# Patient Record
Sex: FEMALE | Race: BLACK OR AFRICAN AMERICAN | NOT HISPANIC OR LATINO
[De-identification: names, ages, dates, MRNs, and addresses within clinical notes are randomized per-mention and may not be internally consistent; named-entity substitution may affect disease eponyms.]

---

## 2017-07-10 ENCOUNTER — RESULT REVIEW (OUTPATIENT)
Age: 52
End: 2017-07-10

## 2018-02-22 ENCOUNTER — EMERGENCY (EMERGENCY)
Facility: HOSPITAL | Age: 53
LOS: 1 days | End: 2018-02-22
Attending: PERSONAL EMERGENCY RESPONSE ATTENDANT | Admitting: PERSONAL EMERGENCY RESPONSE ATTENDANT
Payer: COMMERCIAL

## 2018-02-22 VITALS
TEMPERATURE: 100 F | SYSTOLIC BLOOD PRESSURE: 148 MMHG | OXYGEN SATURATION: 99 % | DIASTOLIC BLOOD PRESSURE: 88 MMHG | RESPIRATION RATE: 18 BRPM | HEART RATE: 87 BPM

## 2018-02-22 DIAGNOSIS — Z98.1 ARTHRODESIS STATUS: Chronic | ICD-10-CM

## 2018-02-22 LAB
ALBUMIN SERPL ELPH-MCNC: 4.6 G/DL — SIGNIFICANT CHANGE UP (ref 3.3–5)
ALP SERPL-CCNC: 48 U/L — SIGNIFICANT CHANGE UP (ref 40–120)
ALT FLD-CCNC: 13 U/L RC — SIGNIFICANT CHANGE UP (ref 10–45)
ANION GAP SERPL CALC-SCNC: 12 MMOL/L — SIGNIFICANT CHANGE UP (ref 5–17)
APTT BLD: 27.9 SEC — SIGNIFICANT CHANGE UP (ref 27.5–37.4)
AST SERPL-CCNC: 18 U/L — SIGNIFICANT CHANGE UP (ref 10–40)
BASOPHILS # BLD AUTO: 0 K/UL — SIGNIFICANT CHANGE UP (ref 0–0.2)
BASOPHILS NFR BLD AUTO: 0.7 % — SIGNIFICANT CHANGE UP (ref 0–2)
BILIRUB SERPL-MCNC: 0.3 MG/DL — SIGNIFICANT CHANGE UP (ref 0.2–1.2)
BUN SERPL-MCNC: 20 MG/DL — SIGNIFICANT CHANGE UP (ref 7–23)
CALCIUM SERPL-MCNC: 10.4 MG/DL — SIGNIFICANT CHANGE UP (ref 8.4–10.5)
CHLORIDE SERPL-SCNC: 101 MMOL/L — SIGNIFICANT CHANGE UP (ref 96–108)
CK MB BLD-MCNC: 1.8 % — SIGNIFICANT CHANGE UP (ref 0–3.5)
CK MB CFR SERPL CALC: 2 NG/ML — SIGNIFICANT CHANGE UP (ref 0–3.8)
CK SERPL-CCNC: 111 U/L — SIGNIFICANT CHANGE UP (ref 25–170)
CO2 SERPL-SCNC: 23 MMOL/L — SIGNIFICANT CHANGE UP (ref 22–31)
CREAT SERPL-MCNC: 0.97 MG/DL — SIGNIFICANT CHANGE UP (ref 0.5–1.3)
EOSINOPHIL # BLD AUTO: 0.2 K/UL — SIGNIFICANT CHANGE UP (ref 0–0.5)
EOSINOPHIL NFR BLD AUTO: 2.5 % — SIGNIFICANT CHANGE UP (ref 0–6)
GLUCOSE SERPL-MCNC: 123 MG/DL — HIGH (ref 70–99)
HCT VFR BLD CALC: 41.4 % — SIGNIFICANT CHANGE UP (ref 34.5–45)
HGB BLD-MCNC: 13.9 G/DL — SIGNIFICANT CHANGE UP (ref 11.5–15.5)
INR BLD: 1.03 RATIO — SIGNIFICANT CHANGE UP (ref 0.88–1.16)
LYMPHOCYTES # BLD AUTO: 2.2 K/UL — SIGNIFICANT CHANGE UP (ref 1–3.3)
LYMPHOCYTES # BLD AUTO: 32.1 % — SIGNIFICANT CHANGE UP (ref 13–44)
MCHC RBC-ENTMCNC: 29.5 PG — SIGNIFICANT CHANGE UP (ref 27–34)
MCHC RBC-ENTMCNC: 33.6 GM/DL — SIGNIFICANT CHANGE UP (ref 32–36)
MCV RBC AUTO: 87.6 FL — SIGNIFICANT CHANGE UP (ref 80–100)
MONOCYTES # BLD AUTO: 0.4 K/UL — SIGNIFICANT CHANGE UP (ref 0–0.9)
MONOCYTES NFR BLD AUTO: 6.4 % — SIGNIFICANT CHANGE UP (ref 2–14)
NEUTROPHILS # BLD AUTO: 3.9 K/UL — SIGNIFICANT CHANGE UP (ref 1.8–7.4)
NEUTROPHILS NFR BLD AUTO: 58.3 % — SIGNIFICANT CHANGE UP (ref 43–77)
PLATELET # BLD AUTO: 344 K/UL — SIGNIFICANT CHANGE UP (ref 150–400)
POTASSIUM SERPL-MCNC: 4.6 MMOL/L — SIGNIFICANT CHANGE UP (ref 3.5–5.3)
POTASSIUM SERPL-SCNC: 4.6 MMOL/L — SIGNIFICANT CHANGE UP (ref 3.5–5.3)
PROT SERPL-MCNC: 8 G/DL — SIGNIFICANT CHANGE UP (ref 6–8.3)
PROTHROM AB SERPL-ACNC: 11.2 SEC — SIGNIFICANT CHANGE UP (ref 9.8–12.7)
RBC # BLD: 4.73 M/UL — SIGNIFICANT CHANGE UP (ref 3.8–5.2)
RBC # FLD: 14.1 % — SIGNIFICANT CHANGE UP (ref 10.3–14.5)
SODIUM SERPL-SCNC: 136 MMOL/L — SIGNIFICANT CHANGE UP (ref 135–145)
TROPONIN T SERPL-MCNC: <0.01 NG/ML — SIGNIFICANT CHANGE UP (ref 0–0.06)
WBC # BLD: 6.8 K/UL — SIGNIFICANT CHANGE UP (ref 3.8–10.5)
WBC # FLD AUTO: 6.8 K/UL — SIGNIFICANT CHANGE UP (ref 3.8–10.5)

## 2018-02-22 PROCEDURE — 71046 X-RAY EXAM CHEST 2 VIEWS: CPT | Mod: 26

## 2018-02-22 PROCEDURE — 93010 ELECTROCARDIOGRAM REPORT: CPT

## 2018-02-22 PROCEDURE — 99220: CPT | Mod: 25

## 2018-02-22 RX ORDER — ASPIRIN/CALCIUM CARB/MAGNESIUM 324 MG
325 TABLET ORAL ONCE
Qty: 0 | Refills: 0 | Status: COMPLETED | OUTPATIENT
Start: 2018-02-22 | End: 2018-02-22

## 2018-02-22 RX ORDER — FENOFIBRATE,MICRONIZED 130 MG
145 CAPSULE ORAL AT BEDTIME
Qty: 0 | Refills: 0 | Status: DISCONTINUED | OUTPATIENT
Start: 2018-02-22 | End: 2018-02-26

## 2018-02-22 RX ORDER — SIMVASTATIN 20 MG/1
20 TABLET, FILM COATED ORAL AT BEDTIME
Qty: 0 | Refills: 0 | Status: DISCONTINUED | OUTPATIENT
Start: 2018-02-22 | End: 2018-02-26

## 2018-02-22 RX ORDER — FAMOTIDINE 10 MG/ML
20 INJECTION INTRAVENOUS ONCE
Qty: 0 | Refills: 0 | Status: COMPLETED | OUTPATIENT
Start: 2018-02-22 | End: 2018-02-23

## 2018-02-22 RX ORDER — SODIUM CHLORIDE 9 MG/ML
3 INJECTION INTRAMUSCULAR; INTRAVENOUS; SUBCUTANEOUS EVERY 12 HOURS
Qty: 0 | Refills: 0 | Status: DISCONTINUED | OUTPATIENT
Start: 2018-02-22 | End: 2018-02-26

## 2018-02-22 RX ORDER — LIDOCAINE 4 G/100G
10 CREAM TOPICAL ONCE
Qty: 0 | Refills: 0 | Status: DISCONTINUED | OUTPATIENT
Start: 2018-02-22 | End: 2018-02-26

## 2018-02-22 RX ORDER — METFORMIN HYDROCHLORIDE 850 MG/1
850 TABLET ORAL DAILY
Qty: 0 | Refills: 0 | Status: DISCONTINUED | OUTPATIENT
Start: 2018-02-22 | End: 2018-02-26

## 2018-02-22 NOTE — ED ADULT NURSE NOTE - CHPI ED SYMPTOMS NEG
no wheezing/no fever/no hemoptysis/no chest pain/no cough/no headache/no diaphoresis/no chills/no body aches

## 2018-02-22 NOTE — ED CDU PROVIDER INITIAL DAY NOTE - OBJECTIVE STATEMENT
51 y/o F with pmhx DM, GERD, HLD, and daily cigar smoking, presenting with constant SOB since Sunday. pt reports SOB seems to be worsened after eating. thought it was her acid reflux but went to [facility] MD who sent her in for concern for her heart.  pt reports that SOB is not worsened by exertion. Does report mild congestion and very mild dry cough consistent with her seasonal allergies. Took pepto-bismal without relief.   She denies chest pain, fever, vomiting, exertional dyspnea, orthopnea, LE edema, PND, recent travel

## 2018-02-22 NOTE — ED ADULT NURSE NOTE - PMH
Anemia  mild, takes iron PRN  Diabetes Mellitus  since 2008 and does not check sugar levels  Disc Disease with Myelopathy, Cervical  8/11  High Triglycerides    Hypercholesterolemia    LAMBERTO (Obstructive Sleep Apnea)  signs and symptoms and never tested  Seasonal allergies    Vertigo

## 2018-02-22 NOTE — ED PROVIDER NOTE - MEDICAL DECISION MAKING DETAILS
52 y f with DM, hld, daily smoker presenting with SOB since sunday not made worse with exercise. DDx: ACS, COPD, GERD. Will get cardiac enzymes ekg, CXR, if delta trop negative will likely DC with cardiology follow up of CDU for stress.

## 2018-02-22 NOTE — ED CDU PROVIDER INITIAL DAY NOTE - MEDICAL DECISION MAKING DETAILS
Attending MD Manzo.  Agree with above.  PT is a 52 yr old female with GERD, HLD, DMII, daily cigar smoker with SOB since sunday.  Mild assoc nausea.  PT has had no CP/abdominal pain/vomiting.  Pt seen at [facility] md today.  PT told she should come to ED for ACS rule-out.  Onset of SOB was non-descript.  Pt cannot recall and inciting event.  Equal exercise tolerance since sxs began.  PE pt is well appearing no LE DVT signs.  BReath sounds clear.  No CP on palpation.  No fevers/chills/orthopnea.  NO assoc palpitations.  Likely pulmonary in nature.  No previous cardiac work-up or cards hx.  Pt has risk factors of DMII, HLD, age, daily cigar smoking.  Pt warrants troponin cycling and stress test given cardiac risk factors and SOB.  Will likely require pulm referral as outpt if cardiac work-up is negative.  No LE edema, no chest pain, no hypoxia or tachycardia.  Pt amenable to CDU stay.  Stable for transfer of care to observation/CDU.

## 2018-02-22 NOTE — ED PROVIDER NOTE - OBJECTIVE STATEMENT
Pt is 52 Y F with pmhx DM, GERD, HLD, and daily cigar smoking, presenting with constant SOB since Sunday. She was seen in [facility] MD today and told to come to ED for cardiac workup. She admits to nausea. She denies chest pain, fever, vomiting, exertional dyspnea, orthopnea, LE edema, cough, PND.

## 2018-02-22 NOTE — ED ADULT NURSE NOTE - OBJECTIVE STATEMENT
pt states, "I have been feeling SOB for the past x5 days. Today I decided to go to my PMD and I had an EKG done. I was told there were changes and was told to come to the ED." pt denies any lightheadedness, dizziness, GALLAGHER, cough, fever/chills, v/d, abd. pain, numbness/tingling, or any worsening leg swelling. pt endorses feeling increasingly more SOB when lying down.

## 2018-02-22 NOTE — ED ADULT NURSE NOTE - NS ED NURSE DC INFO COMPLEXITY
Simple: Patient demonstrates quick and easy understanding/Verbalized Understanding/Straightforward: Basic instructions, no meds, no home treatment/Patient asked questions/Returned Demonstration

## 2018-02-22 NOTE — ED CDU PROVIDER INITIAL DAY NOTE - ATTENDING CONTRIBUTION TO CARE
Attending MD Manzo.  Agree with above.  PT is a 52 yr old female with GERD, HLD, DMII, daily cigar smoker with SOB since sunday.  Mild assoc nausea.  PT has had no CP/abdominal pain/vomiting.  Pt seen at [facility] md today.  PT told she should come to ED for ACS rule-out.  Onset of SOB was non-descript.  Pt cannot recall and inciting event.  Equal exercise tolerance since sxs began.  PE pt is well appearing no LE DVT signs.  BReath sounds clear.  No CP on palpation.  No fevers/chills/orthopnea.  NO assoc palpitations.  Likely pulmonary in nature.  No previous cardiac work-up or cards hx.  Pt has risk factors of DMII, HLD, age, daily cigar smoking.  Pt warrants troponin cycling and stress test given cardiac risk factors and SOB.  Will likely require pulm referral as outpt if cardiac work-up is negative.  No LE edema, no chest pain, no hypoxia or tachycardia.  Pt amenable to CDU stay.  Stable for transfer of care to observation/CDU.

## 2018-02-23 VITALS
OXYGEN SATURATION: 98 % | DIASTOLIC BLOOD PRESSURE: 71 MMHG | RESPIRATION RATE: 18 BRPM | SYSTOLIC BLOOD PRESSURE: 120 MMHG | TEMPERATURE: 98 F | HEART RATE: 87 BPM

## 2018-02-23 LAB
D DIMER BLD IA.RAPID-MCNC: <150 NG/ML DDU — SIGNIFICANT CHANGE UP
GLUCOSE BLDC GLUCOMTR-MCNC: 152 MG/DL — HIGH (ref 70–99)
TROPONIN T SERPL-MCNC: <0.01 NG/ML — SIGNIFICANT CHANGE UP (ref 0–0.06)

## 2018-02-23 PROCEDURE — 80061 LIPID PANEL: CPT

## 2018-02-23 PROCEDURE — 99284 EMERGENCY DEPT VISIT MOD MDM: CPT | Mod: 25

## 2018-02-23 PROCEDURE — 93016 CV STRESS TEST SUPVJ ONLY: CPT

## 2018-02-23 PROCEDURE — 82550 ASSAY OF CK (CPK): CPT

## 2018-02-23 PROCEDURE — 93017 CV STRESS TEST TRACING ONLY: CPT

## 2018-02-23 PROCEDURE — 96374 THER/PROPH/DIAG INJ IV PUSH: CPT | Mod: XU

## 2018-02-23 PROCEDURE — 71046 X-RAY EXAM CHEST 2 VIEWS: CPT

## 2018-02-23 PROCEDURE — 78452 HT MUSCLE IMAGE SPECT MULT: CPT

## 2018-02-23 PROCEDURE — 85610 PROTHROMBIN TIME: CPT

## 2018-02-23 PROCEDURE — 93018 CV STRESS TEST I&R ONLY: CPT

## 2018-02-23 PROCEDURE — 82962 GLUCOSE BLOOD TEST: CPT

## 2018-02-23 PROCEDURE — 84484 ASSAY OF TROPONIN QUANT: CPT

## 2018-02-23 PROCEDURE — 85730 THROMBOPLASTIN TIME PARTIAL: CPT

## 2018-02-23 PROCEDURE — 83036 HEMOGLOBIN GLYCOSYLATED A1C: CPT

## 2018-02-23 PROCEDURE — 85379 FIBRIN DEGRADATION QUANT: CPT

## 2018-02-23 PROCEDURE — 80053 COMPREHEN METABOLIC PANEL: CPT

## 2018-02-23 PROCEDURE — 93005 ELECTROCARDIOGRAM TRACING: CPT

## 2018-02-23 PROCEDURE — 85027 COMPLETE CBC AUTOMATED: CPT

## 2018-02-23 PROCEDURE — 99217: CPT

## 2018-02-23 PROCEDURE — 82553 CREATINE MB FRACTION: CPT

## 2018-02-23 PROCEDURE — 78452 HT MUSCLE IMAGE SPECT MULT: CPT | Mod: 26

## 2018-02-23 PROCEDURE — A9500: CPT

## 2018-02-23 PROCEDURE — G0378: CPT

## 2018-02-23 RX ADMIN — Medication 145 MILLIGRAM(S): at 00:25

## 2018-02-23 RX ADMIN — METFORMIN HYDROCHLORIDE 850 MILLIGRAM(S): 850 TABLET ORAL at 12:42

## 2018-02-23 RX ADMIN — SODIUM CHLORIDE 3 MILLILITER(S): 9 INJECTION INTRAMUSCULAR; INTRAVENOUS; SUBCUTANEOUS at 06:12

## 2018-02-23 RX ADMIN — Medication 30 MILLILITER(S): at 00:02

## 2018-02-23 RX ADMIN — FAMOTIDINE 20 MILLIGRAM(S): 10 INJECTION INTRAVENOUS at 00:02

## 2018-02-23 RX ADMIN — Medication 325 MILLIGRAM(S): at 00:10

## 2018-02-23 RX ADMIN — SIMVASTATIN 20 MILLIGRAM(S): 20 TABLET, FILM COATED ORAL at 00:25

## 2018-02-23 NOTE — ED CDU PROVIDER SUBSEQUENT DAY NOTE - PROGRESS NOTE DETAILS
CDU NOTE JESSENIA Frank: pt asleep. NAD most recent VSS. no events on tele. Pt is a 53 y/o F with pmhx DM, GERD, HLD, and daily cigar smoking, who presented with constant SOB since Sunday, worsened after eating. Sent in by urgent care. pt reports that SOB is not worsened by exertion. Does report mild congestion and very mild dry cough consistent with her seasonal allergies. Took pepto-bismal without relief. no cp. pt had negative cardiac enzyme in ED and non-emergent findings on EKG, sent to CDU for observation including serial cardiac enzymes, tele monitoring and nuclear stress test. pt did well overnight, no acute events on tele, had 2 negative cardiac enzymes. Pt is at stress test at this time. Will re-evaluate upon return. Pt comfortable. No complaints. Vital signs stable. Will continue to observe and reassess. Awaiting stress test. Pt is a 51 y/o F with pmhx DM, GERD, HLD, and daily cigar smoking, who presented with constant SOB since Sunday, worsened after eating. Sent in by urgent care. pt reports that SOB is not worsened by exertion. Does report mild congestion and very mild dry cough consistent with her seasonal allergies. Took pepto-bismal without relief. no cp. pt had negative cardiac enzyme in ED and non-emergent findings on EKG, sent to CDU for observation including serial cardiac enzymes, tele monitoring and nuclear stress test. pt did well overnight, no acute events on tele, had 2 negative cardiac enzymes. Pt is at stress test at this time. Will re-evaluate upon return. Pt comfortable. No complaints. Vital signs stable. stress test negative. discussed with dr. andrade, will d/c pt home with pepcid and follow up with her Gastroenterologist. -Francie De La Paz PA-C Pt comfortable. No complaints. Vital signs stable. Will continue to observe and reassess. Awaiting  stress test results. -Francie De La Paz PA-C

## 2018-02-23 NOTE — ED ADULT NURSE REASSESSMENT NOTE - COMFORT CARE
meal provided/po fluids offered/plan of care explained
warm blanket provided/assisted to bathroom/plan of care explained

## 2018-02-23 NOTE — ED CDU PROVIDER SUBSEQUENT DAY NOTE - MEDICAL DECISION MAKING DETAILS
51 y/o F with pmhx DM, GERD, HLD, and daily cigar smoking, presented with constant SOB since Sunday. pt reports SOB seems to be worsened after eating. thought it was her acid reflux but went to [facility] who sent her in for concern for her heart.  pt reports that SOB is not worsened by exertion. Does report mild congestion and very mild dry cough consistent with her seasonal allergies. Took pepto-bismal without relief. no cp. pt had negative cardiac enzyme in ED and non-emergent findings on EKG, sent to CDU for observation including serial cardiac enzymes, tele monitoring and nuclear stress test. pt did well overnight, no acute events on tele.

## 2018-02-23 NOTE — ED CDU PROVIDER SUBSEQUENT DAY NOTE - HISTORY
CDU NOTE JESSENIA Frank: pt resting, reports SOB still present and did worsen after eating.  pt does report that Maalox and Pepcid improved epigastric discomfort but it did not seem to improve SOB. denies exertional dyspnea, cp, dizziness, numbness/tingling. NAD most recent VSS. no events on tele.

## 2018-02-23 NOTE — ED CDU PROVIDER DISPOSITION NOTE - PLAN OF CARE
1. Stay hydrated.  2. Continue Current Home Medications  3. Follow up with your PCP Dr. Reyes in 1-2 days (Bring printed results to your doctor visit).  4. Return if symptoms, worsen, fever, weakness, chest pain, difficulty breathing, dizziness and all other concerns. 1. Stay hydrated.  2. Continue Current Home Medications. Start Pepcid 20mg 2 times per day.  3. Follow up with your PCP Dr. Reyes in 2-3 days (Bring printed results to your doctor visit). Follow up with your gastroenterologist next week.  4. Return if symptoms worsen, or if you develop any fever, weakness, chest pain, difficulty breathing, dizziness and all other concerns.

## 2018-02-23 NOTE — ED ADULT NURSE REASSESSMENT NOTE - ANCILLARY STATUS
awaiting Nuc Stress/EKG at bedside/cardiovascular tests pending/awaiting lab draw
cardiovascular tests pending

## 2018-02-23 NOTE — ED CDU PROVIDER DISPOSITION NOTE - CLINICAL COURSE
51 y/o F with pmhx DM, GERD, HLD, and daily cigar smoking, presented with constant SOB since Sunday. pt reports SOB seems to be worsened after eating. thought it was her acid reflux but went to [facility] who sent her in for concern for her heart.  pt reports that SOB is not worsened by exertion. Does report mild congestion and very mild dry cough consistent with her seasonal allergies. Took pepto-bismal without relief. no cp. pt had negative cardiac enzyme in ED and non-emergent findings on EKG, sent to CDU for observation including serial cardiac enzymes, tele monitoring and nuclear stress test. pt did well overnight, no acute events on tele. 51 y/o F with pmhx DM, GERD, HLD, and daily cigar smoking, presented with constant SOB since Sunday. pt reports SOB seems to be worsened after eating. thought it was her acid reflux but went to [facility] who sent her in for concern for her heart.  pt reports that SOB is not worsened by exertion. Does report mild congestion and very mild dry cough consistent with her seasonal allergies. Took pepto-bismal without relief. no cp. pt had negative cardiac enzyme in ED and non-emergent findings on EKG, sent to CDU for observation including serial cardiac enzymes, tele monitoring and nuclear stress test. pt did well overnight, no acute events on tele. Stress test negative. pt discharged home on pepcid and outpt follow up with her gastroenterologist.

## 2018-02-23 NOTE — ED ADULT NURSE REASSESSMENT NOTE - NS ED NURSE REASSESS COMMENT FT1
15.30 Pt was reviewed by  DR Griselda Webber   & JESSENIA Marmolejo with all the Results  Pt discharged Pt had no CP SOB  ML out  Stable to go home
07.00 AM Received Report from Pawel Grace.  07.30 Am  Pt reassessed .  Pt is resting Marian Headache N/V/D fever  chills CP SOB  Dizziness  or change in vision .  . Vital Signs  WDL. IV site no infiltration noted .  Comfort & safety measures continued . Pt not in pain .  Pt is awaiting for CDU EVAL & decision  Continue to monitor & Nuclear stress test   08.00 Pt went for Nuclear stress test
Pt received from [place]. Pt oriented to CDU & plan of care was discussed. Pt denies any chest pain, dizziness or palpitations. Pt states she has SOB and feels like she has to think about catching her breath. Pt states after eating SOB is worse & it helps when laying down. Safety & comfort measures maintained. Call bell in reach. Will continue to monitor.

## 2018-08-20 ENCOUNTER — APPOINTMENT (OUTPATIENT)
Dept: ORTHOPEDIC SURGERY | Facility: CLINIC | Age: 53
End: 2018-08-20
Payer: COMMERCIAL

## 2018-08-28 ENCOUNTER — EMERGENCY (EMERGENCY)
Facility: HOSPITAL | Age: 53
LOS: 1 days | Discharge: HOME | End: 2018-08-28
Attending: EMERGENCY MEDICINE
Payer: COMMERCIAL

## 2018-08-28 VITALS
RESPIRATION RATE: 17 BRPM | DIASTOLIC BLOOD PRESSURE: 98 MMHG | SYSTOLIC BLOOD PRESSURE: 156 MMHG | TEMPERATURE: 98 F | HEART RATE: 97 BPM | OXYGEN SATURATION: 98 %

## 2018-08-28 DIAGNOSIS — Z98.1 ARTHRODESIS STATUS: Chronic | ICD-10-CM

## 2018-08-28 PROCEDURE — 99282 EMERGENCY DEPT VISIT SF MDM: CPT

## 2018-08-28 PROCEDURE — 99283 EMERGENCY DEPT VISIT LOW MDM: CPT

## 2018-08-28 RX ORDER — DIPHENHYDRAMINE HCL 50 MG
50 CAPSULE ORAL ONCE
Qty: 0 | Refills: 0 | Status: DISCONTINUED | OUTPATIENT
Start: 2018-08-28 | End: 2018-08-28

## 2018-08-28 RX ORDER — DIPHENHYDRAMINE HCL 50 MG
25 CAPSULE ORAL ONCE
Qty: 0 | Refills: 0 | Status: COMPLETED | OUTPATIENT
Start: 2018-08-28 | End: 2018-08-28

## 2018-08-28 NOTE — ED ADULT TRIAGE NOTE - CHIEF COMPLAINT QUOTE
HA since 8/25 given amoxicillin for sinus infection CORDOVA improved still having some HA and leg weakness

## 2018-08-28 NOTE — ED PROVIDER NOTE - OBJECTIVE STATEMENT
Pt is 52 Y F with pmhx DM, GERD, HLD, Pt is 52 Y F with pmhx DM, GERD, HLD who presents to the ED for concern regarding her sinus headache. patient reports she started with sinus congestion july 25, went to pmd 10 days later and was started on amox and zyrtec. states she feels somewhat better but has persistent dull aching fcial pain. chills without fever. no n/v/d. states yesterday she felt sluggish like her legs were heavy. no UE weakness. no incontinence

## 2018-08-28 NOTE — ED PROVIDER NOTE - ATTENDING CONTRIBUTION TO CARE
Patient presenting c/o frontal headache.  Improving with amoxicillin and decongestants at home but not resolved from PMD.  No fevers.  Headaches worsen with changing head position.  Reporting some weakness of bilateral legs last night now resolved without sensation changes.  On exam patient well appearing, vital signs within normal limits, RRR S1/S2, lungs clear to ascultation bilaterally, normal neurologic exam, tenderness to bilateral maxillary sinuses reproduce complaint, nasal mucosa slightly erythematous.  Cause of reported weakness unclear but do not suspect CVA as bilateral with normal exam at this time, clinically patient with continuing sinusitis, however do not suspect intracranial abscess clinically - no indication for imaging at this time, will refer to ENT.

## 2018-08-28 NOTE — ED PROVIDER NOTE - PLAN OF CARE
Follow up with your Primary Care Physician within the next 2-3 days  Bring a copy of your test results with you to your appointment  Continue your current medication regimen  Return to the Emergency Room if you experience new or worsening symptoms  finish your amoxicillin  continue zyrtec daily  Benadryl 25-50mg every 4-6 hours as needed   Follow up with [phone]

## 2018-08-28 NOTE — ED PROVIDER NOTE - CARE PLAN
Principal Discharge DX:	Sinus congestion  Assessment and plan of treatment:	Follow up with your Primary Care Physician within the next 2-3 days  Bring a copy of your test results with you to your appointment  Continue your current medication regimen  Return to the Emergency Room if you experience new or worsening symptoms  finish your amoxicillin  continue zyrtec daily  Benadryl 25-50mg every 4-6 hours as needed   Follow up with [phone]

## 2018-08-28 NOTE — ED ADULT NURSE NOTE - OBJECTIVE STATEMENT
54 yo female A&OX3 presents to the ED with the c/o sinus headache. Pt states that she is currently on amoxicillin for sinus infection, states that her headache is not relived. + facial pain, states that the pain is dull. Pt denies fevers and chills. Lungs clear, equal b/l no cough and no sob. MAEX4

## 2018-09-10 ENCOUNTER — APPOINTMENT (OUTPATIENT)
Dept: ORTHOPEDIC SURGERY | Facility: CLINIC | Age: 53
End: 2018-09-10
Payer: COMMERCIAL

## 2018-09-10 VITALS
WEIGHT: 215 LBS | HEART RATE: 83 BPM | BODY MASS INDEX: 38.09 KG/M2 | SYSTOLIC BLOOD PRESSURE: 131 MMHG | DIASTOLIC BLOOD PRESSURE: 84 MMHG | HEIGHT: 63 IN

## 2018-09-10 DIAGNOSIS — M17.12 UNILATERAL PRIMARY OSTEOARTHRITIS, LEFT KNEE: ICD-10-CM

## 2018-09-10 DIAGNOSIS — F17.200 NICOTINE DEPENDENCE, UNSPECIFIED, UNCOMPLICATED: ICD-10-CM

## 2018-09-10 DIAGNOSIS — Z86.39 PERSONAL HISTORY OF OTHER ENDOCRINE, NUTRITIONAL AND METABOLIC DISEASE: ICD-10-CM

## 2018-09-10 DIAGNOSIS — Z78.9 OTHER SPECIFIED HEALTH STATUS: ICD-10-CM

## 2018-09-10 PROCEDURE — 73564 X-RAY EXAM KNEE 4 OR MORE: CPT

## 2018-09-10 PROCEDURE — 99204 OFFICE O/P NEW MOD 45 MIN: CPT

## 2018-09-10 RX ORDER — HYALURONATE SOD, CROSS-LINKED 30 MG/3 ML
30 SYRINGE (ML) INTRAARTICULAR
Qty: 1 | Refills: 0 | Status: ACTIVE | OUTPATIENT
Start: 2018-09-10

## 2020-12-07 ENCOUNTER — APPOINTMENT (OUTPATIENT)
Dept: ORTHOPEDIC SURGERY | Facility: CLINIC | Age: 55
End: 2020-12-07
Payer: COMMERCIAL

## 2020-12-07 VITALS
SYSTOLIC BLOOD PRESSURE: 146 MMHG | WEIGHT: 206 LBS | TEMPERATURE: 96.4 F | BODY MASS INDEX: 36.5 KG/M2 | DIASTOLIC BLOOD PRESSURE: 79 MMHG | HEIGHT: 63 IN | HEART RATE: 92 BPM

## 2020-12-07 PROCEDURE — 72110 X-RAY EXAM L-2 SPINE 4/>VWS: CPT

## 2020-12-07 PROCEDURE — 99214 OFFICE O/P EST MOD 30 MIN: CPT

## 2020-12-07 PROCEDURE — 72170 X-RAY EXAM OF PELVIS: CPT | Mod: 59

## 2020-12-07 PROCEDURE — 99072 ADDL SUPL MATRL&STAF TM PHE: CPT

## 2020-12-07 RX ORDER — DICLOFENAC SODIUM 50 MG/1
50 TABLET, DELAYED RELEASE ORAL
Qty: 60 | Refills: 1 | Status: DISCONTINUED | COMMUNITY
Start: 2018-09-10 | End: 2020-12-07

## 2020-12-07 NOTE — PHYSICAL EXAM
[Stooped] : stooped [Cane] : ambulates with cane [] : Motor: [NL] : Motor strength of the left lower extremity was normal [___/5] : right ([unfilled]/5) [Motor Strength Lower Extremities] : right (5/5) [LE] : Sensory: Intact in bilateral lower extremities [0] : left ankle jerk 0 [DP] : dorsalis pedis 2+ and symmetric bilaterally [PT] : posterior tibial 2+ and symmetric bilaterally [SLR] : negative straight leg raise [Plantar Reflex Right Only] : absent on the right [Plantar Reflex Left Only] : absent on the left [DTR Reflexes Clonus Of Right Ankle (___ Beats)] : absent on the right [DTR Reflexes Clonus Of Left Ankle (___ Beats)] : absent on the left [de-identified] : The pt is awake, alert and oriented to self, place and time, is comfortable and in no acute distress. Inspection of neck, back and lower extremities bilaterally reveals no rashes or ecchymotic lesions. There is no tenderness over the cervical, thoracic or lumbar spine, or the paraspinal or upper and lower extremities musculature. There is no sacroiliac tenderness. No greater trochanteric tenderness bilaterally. No atrophy or abnormal movements noted in the upper or lower extremities. There is no swelling noted in the upper or lower extremities bilaterally. No cervical lymphadenopathy noted anteriorly. No joint laxity noted in the upper and lower extremity joints bilaterally.\par Hip range of motion is degrees internal rotation 30° external rotation without pain. Full range of motion of the shoulders bilaterally with no significant pain\par There is no groin pain with hip internal rotation and a negative SAÚL test bilaterally.  [de-identified] : sharmin forward and to the left\par healed left cervical spine incision\par flexion to the floor, extension to neutral with increased pain. [de-identified] : 4 views lumbar spine obtained today demonstrate trunk shift to the left.  On the lateral projection stooped forward posture noted with lumbar lordosis.  Grade 2-3 spondylolisthesis seen at L4-5 with no obvious dynamic instability between flexion extension.  Associated degenerative changes noted.  However these are obscured by the patient's leaning to the left.\par \par AP pelvis demonstrates normal appearance of the hips bilaterally.  No acute fractures.  No significant degeneration.  Trunk shift to the left noted.  No obvious limb length discrepancy or pelvic tilt noted.

## 2020-12-07 NOTE — HISTORY OF PRESENT ILLNESS
[Worsening] : worsening [9] : a current pain level of 9/10 [Standing] : standing [Daily] : ~He/She~ states the symptoms seem to be occuring daily [Prolonged Standing] : worsened by prolonged standing [Walking] : worsened by walking [Heat] : relieved by heat [Rest] : relieved by rest [___ mths] : [unfilled] month(s) ago [de-identified] : Patient is here today for evaluation on her low back radiating into bilateral buttocks going on for approximately one month. Patient states in July after exercising, doing floor exercises, hurt lower back saw Neurologist at that time emg/ncv done which patient states was negative did home exercises and then past month pain in low back again. Walking causes back pain.\par 1 block exercise tolerance, starts leaning and feels like she is going to fall over the past 2 years\par Hx of sciatica in the past. \par Has been bike riding.\par Works for [employer] of health, sitting work now from home\par Hx of 2 prior cervical surgeries

## 2020-12-07 NOTE — CONSULT LETTER
[Dear  ___] : Dear  [unfilled], [Consult Letter:] : I had the pleasure of evaluating your patient, [unfilled]. [FreeTextEntry2] : Aydin Reyes [FreeTextEntry1] : Thank you for this referral. I have enclosed my note for your review. Please feel free to contact my office if you have additional questions regarding this patient.\par \par Regards,\par Genaro Tracy MD, FACS, FAAOS\par \par  of Orthopaedic Surgery\par [facility] [employer]\par Spinal Reconstruction Surgery\par Minimally Invasive Spinal Surgery\par [facility]

## 2020-12-07 NOTE — DISCUSSION/SUMMARY
[Medication Risks Reviewed] : Medication risks reviewed [de-identified] : The patient has symptoms consistent with neurogenic claudication in the setting of grade 2 through 3 spondylolisthesis at the L4-5 level.  She has stooped forward posture and leans to the left.  She has difficulty walking more than short distances and has reported frequent falls or sensation of legs giving out.  She has been using a cane over the past 2 years.  She feels better when leaning forward on a grocery cart and is unable to stand fully erect.  In this situation I recommended MRI lumbar spine to better evaluate her condition.  She understands a definitive treatment for this likely will involve surgical intervention but this will be based on MRI findings and may need laminectomy and fusion at the L4-5 level with spinal realignment and fusion.  Lumbar epidural steroid injections may also be a consideration along with physical therapy.  I will see her back after the MRI is been performed.\par \par The patient was educated regarding their condition, treatment options as well as prescribed course of treatment. \par Risks and benefits as well as alternatives to the proposed treatment were also provided to the patient \par They were given the opportunity to have all their questions answered to their satisfaction.\par \par Vital signs were reviewed with the patient and the patient was instructed to followup with their primary care provider for further management.\par \par Healthy lifestyle recommendations were also made including a tobacco free lifestyle, proper diet, and weight control.

## 2021-01-06 ENCOUNTER — NON-APPOINTMENT (OUTPATIENT)
Age: 56
End: 2021-01-06

## 2021-01-06 ENCOUNTER — APPOINTMENT (OUTPATIENT)
Dept: ORTHOPEDIC SURGERY | Facility: CLINIC | Age: 56
End: 2021-01-06
Payer: COMMERCIAL

## 2021-01-06 VITALS
WEIGHT: 203 LBS | DIASTOLIC BLOOD PRESSURE: 87 MMHG | BODY MASS INDEX: 35.97 KG/M2 | HEIGHT: 63 IN | HEART RATE: 79 BPM | SYSTOLIC BLOOD PRESSURE: 143 MMHG | TEMPERATURE: 97.9 F

## 2021-01-06 PROCEDURE — 99072 ADDL SUPL MATRL&STAF TM PHE: CPT

## 2021-01-06 PROCEDURE — 99214 OFFICE O/P EST MOD 30 MIN: CPT

## 2021-01-06 NOTE — HISTORY OF PRESENT ILLNESS
[Stable] : stable [5] : a current pain level of 5/10 [Walking] : walking [Daily] : ~He/She~ states the symptoms seem to be occuring daily [Prolonged Standing] : worsened by prolonged standing [Rest] : relieved by rest [de-identified] : Patient is here today to review mri lumbar spine 12/23/20. [de-identified] : sitting

## 2021-01-06 NOTE — PHYSICAL EXAM
[Stooped] : stooped [Cane] : ambulates with cane [] : Motor: [NL] : Motor strength of the left lower extremity was normal [___/5] : right ([unfilled]/5) [Motor Strength Lower Extremities] : right (5/5) [LE] : Sensory: Intact in bilateral lower extremities [0] : left ankle jerk 0 [DP] : dorsalis pedis 2+ and symmetric bilaterally [PT] : posterior tibial 2+ and symmetric bilaterally [SLR] : negative straight leg raise [Plantar Reflex Right Only] : absent on the right [Plantar Reflex Left Only] : absent on the left [DTR Reflexes Clonus Of Right Ankle (___ Beats)] : absent on the right [DTR Reflexes Clonus Of Left Ankle (___ Beats)] : absent on the left [de-identified] : The pt is awake, alert and oriented to self, place and time, is comfortable and in no acute distress. Inspection of neck, back and lower extremities bilaterally reveals no rashes or ecchymotic lesions. There is no tenderness over the cervical, thoracic or lumbar spine, or the paraspinal or upper and lower extremities musculature. There is no sacroiliac tenderness. No greater trochanteric tenderness bilaterally. No atrophy or abnormal movements noted in the upper or lower extremities. There is no swelling noted in the upper or lower extremities bilaterally. No cervical lymphadenopathy noted anteriorly. No joint laxity noted in the upper and lower extremity joints bilaterally.\par Hip range of motion is degrees internal rotation 30° external rotation without pain. Full range of motion of the shoulders bilaterally with no significant pain\par There is no groin pain with hip internal rotation and a negative SAÚL test bilaterally.  [de-identified] : sharmin forward and to the left\par healed left cervical spine incision\par flexion to the floor, extension to neutral with increased pain. [de-identified] : MRI lumbar spine without contrast performed at stand-up MRI of great neck on December 23, 2020 demonstrates L4-5 disc herniation with grade 2 spondylolisthesis deforming the thecal sac abutting the proximal L5 nerve roots bilaterally.  Bilateral neural foraminal extension abutting the exiting L4 nerves bilaterally contributing bilateral foraminal stenosis and moderate central stenosis.  No bone marrow edema.  Loss of disc height and signal with disc degeneration.  Disc herniation with superior extension posterior to the adjacent L4 inferior endplate.  L3-4 disc bulge without significant stenosis.  L5-S1 disc herniation with grade 1 spondylolisthesis deforming thecal sac abutting the proximal S1 nerves bilaterally.  No evidence of bone marrow edema within the pars.  Loss of disc signal with preservation of disc height and anterior disc extension.  Both of these levels are worse when compared with previous MRI from September 17, 2015.

## 2021-01-06 NOTE — REASON FOR VISIT
[Follow-Up Visit] : a follow-up visit for [Back Pain] : back pain [Radiculopathy] : radiculopathy [Spinal Stenosis] : spinal stenosis [Spondylolistheses] : spondylolistheses

## 2021-01-06 NOTE — DISCUSSION/SUMMARY
[Medication Risks Reviewed] : Medication risks reviewed [Surgical risks reviewed] : Surgical risks reviewed [de-identified] : The patient has been symptomatic with back and leg pain with symptoms consistent with spinal listhesis grade 2 on the MRI at L4-5 as well as grade 1 L5-S1 with associated disc protrusions and spinal stenosis.  We discussed the pros and cons of surgical intervention versus continued nonsurgical intervention.  Given the duration of symptoms and her symptomatic pain pattern she would like to proceed with surgery.  We discussed the option of doing the L4-5 level alone versus including the L5-S1 at the time of surgery and she would like to proceed with both L4-5 and L5-S1 at the index procedure.  We will schedule it at her earliest convenience when she has had her Covid vaccines.\par \par The patient was advised that based upon their clinical presentation and radiographic findings they meet inclusion criteria for spinal surgery to address their spinal pathology.\par The spectrum of treatments for their spinal condition were reviewed in detail. The goals, alternatives, risks and benefits of spinal surgery were reviewed in detail with the patient.  \par Benefits and risks of operative and nonoperative treatment for the patient's pathology were outlined at length with the patient.  Specifically, those risks are understood to be, but not limited to, bleeding, infection, fracture (both during surgery and after surgery), adjacent level disease, failure to heal (significantly increased by smoking), need for further surgery, failure of instrumentation (if used), recurrence of problem and symptoms, neurovascular injury, dural tears or leaks resulting in spinal fluid leakage and requiring additional invasive and non-invasive treatments, need for additional procedures, possible paralysis resulting in loss of use of arms, legs, bowel and bladder function as well as sexual dysfunction, and anesthetic risks including death. \par Available alternatives to surgery were also discussed with the patient. In addition, the patient further understands that there may be indicated need for somatosensory evoked potential monitoring, real-time EMG monitoring, and motor evoked potential monitoring during the procedure along with placement of needle electrodes. A neuromonitoring service will discuss the risks and benefits separately with the patient.\par The patient also understands that having a surgical procedure and being hospitalized carries risks in addition to the ones described above.\par \par The patient was advised that Dr. Tracy operates as a surgical team with his associate Dr. Red and/or with surgical Physician Assistants (PA)\par \par The patient was advised that they will require a medical preoperative risk evaluation by their PCP. Further medical subspecialty clearances such as cardiac may be indicated if felt needed by their PCP.\par \par The patient was advised he/she may call our office after careful consideration of their treatment options and further consultation with any other physician to begin the process of preoperative planning for elective spinal surgery at a time of their choosing. \par The patient verbalized an understanding of the procedure, its indications, and its common potential complications and attendant risks, and is willing to proceed. No guarantees were made with regard to a complete recovery. We will proceed in a timely manner after obtaining medical clearance.

## 2021-03-05 NOTE — ED CDU PROVIDER INITIAL DAY NOTE - CONSTITUTIONAL NEGATIVE STATEMENT, MLM
Schedule consult with general surgeon for hernia    We will send you lab results    To schedule covid vaccine through [id number], call [phone].  Tuesday morning is good time to call.   no fever and no chills.

## 2021-03-26 ENCOUNTER — OUTPATIENT (OUTPATIENT)
Dept: OUTPATIENT SERVICES | Facility: HOSPITAL | Age: 56
LOS: 1 days | End: 2021-03-26
Payer: COMMERCIAL

## 2021-03-26 VITALS
DIASTOLIC BLOOD PRESSURE: 84 MMHG | HEIGHT: 62 IN | WEIGHT: 214.95 LBS | RESPIRATION RATE: 16 BRPM | OXYGEN SATURATION: 98 % | TEMPERATURE: 99 F | SYSTOLIC BLOOD PRESSURE: 148 MMHG | HEART RATE: 90 BPM

## 2021-03-26 DIAGNOSIS — M43.10 SPONDYLOLISTHESIS, SITE UNSPECIFIED: ICD-10-CM

## 2021-03-26 DIAGNOSIS — M48.062 SPINAL STENOSIS, LUMBAR REGION WITH NEUROGENIC CLAUDICATION: ICD-10-CM

## 2021-03-26 DIAGNOSIS — Z01.818 ENCOUNTER FOR OTHER PREPROCEDURAL EXAMINATION: ICD-10-CM

## 2021-03-26 DIAGNOSIS — Z98.1 ARTHRODESIS STATUS: Chronic | ICD-10-CM

## 2021-03-26 LAB — BLD GP AB SCN SERPL QL: SIGNIFICANT CHANGE UP

## 2021-03-26 PROCEDURE — G0463: CPT

## 2021-03-26 RX ORDER — DEXTROSE 50 % IN WATER 50 %
25 SYRINGE (ML) INTRAVENOUS ONCE
Refills: 0 | Status: DISCONTINUED | OUTPATIENT
Start: 2021-04-06 | End: 2021-04-08

## 2021-03-26 RX ORDER — DEXTROSE 50 % IN WATER 50 %
12.5 SYRINGE (ML) INTRAVENOUS ONCE
Refills: 0 | Status: DISCONTINUED | OUTPATIENT
Start: 2021-04-06 | End: 2021-04-08

## 2021-03-26 RX ORDER — DEXTROSE 50 % IN WATER 50 %
15 SYRINGE (ML) INTRAVENOUS ONCE
Refills: 0 | Status: DISCONTINUED | OUTPATIENT
Start: 2021-04-06 | End: 2021-04-08

## 2021-03-26 RX ORDER — SIMVASTATIN 20 MG/1
1 TABLET, FILM COATED ORAL
Qty: 0 | Refills: 0 | DISCHARGE

## 2021-03-26 RX ORDER — GLUCAGON INJECTION, SOLUTION 0.5 MG/.1ML
1 INJECTION, SOLUTION SUBCUTANEOUS ONCE
Refills: 0 | Status: DISCONTINUED | OUTPATIENT
Start: 2021-04-06 | End: 2021-04-08

## 2021-03-26 NOTE — H&P PST ADULT - NSICDXFAMILYHX_GEN_ALL_CORE_FT
FAMILY HISTORY:  Father  Still living? Yes, Estimated age: Age Unknown  Family history of hypertension in father, Age at diagnosis: Age Unknown  Family history of type 2 diabetes mellitus in father, Age at diagnosis: Age Unknown    Mother  Still living? Unknown  Family history of DVT, Age at diagnosis: Age Unknown    Sibling  Still living? Yes, Estimated age: 51-60  Family history of breast cancer in sister, Age at diagnosis: Age Unknown  Family history of DVT, Age at diagnosis: Age Unknown

## 2021-03-26 NOTE — H&P PST ADULT - NSICDXPASTMEDICALHX_GEN_ALL_CORE_FT
PAST MEDICAL HISTORY:  Anemia mild    Diabetes Mellitus since 2008  -140    Disc Disease with Myelopathy, Cervical 8/11    Hypercholesterolemia     LAMBERTO (Obstructive Sleep Apnea) signs and symptoms and never tested    Seasonal allergies     Vertigo occurs with allergies

## 2021-03-26 NOTE — H&P PST ADULT - FUNCTIONAL LEVEL PRIOR: DRESSING
Consent: Written consent was obtained and risks were reviewed including but not limited to scarring, infection, bleeding, scabbing, incomplete removal, nerve damage and allergy to anesthesia. 0 = independent

## 2021-03-26 NOTE — H&P PST ADULT - NS PRO TALK SOMEONE YN
Discussed Orencia in detail including dosing, side effects, and interactions. Pt showed patient demo pen and pt given written education. Pt told to get tb test and labs done today and again (not tb test) 1 month after she starts orencia, then ever 3 months. Pt told to start orencia in 3 weeks. Pt understood and agreed to plan     no

## 2021-03-26 NOTE — H&P PST ADULT - NSANTHOSAYNRD_GEN_A_CORE
Yes No. LAMBERTO screening performed.  STOP BANG Legend: 0-2 = LOW Risk; 3-4 = INTERMEDIATE Risk; 5-8 = HIGH Risk

## 2021-03-26 NOTE — H&P PST ADULT - ASSESSMENT
Pre op instructions reviewed and understood.  Covid swab to be done at [facility] site.  Vaccine has been received.

## 2021-03-26 NOTE — H&P PST ADULT - NSICDXPROBLEM_GEN_ALL_CORE_FT
PROBLEM DIAGNOSES  Problem: Spinal stenosis, lumbar region with neurogenic claudication  Assessment and Plan: TRANSFORAMINAL LUMBAR INTERBODY FUSION L4-5, L5-S1 POSTERIOR SPINAL FUSION [id number] INSTRUMENTATION L4-S1 INTERBODY CAGES, LAMINECTOMY L4-S1, ILIAC CREST BONE GRAFT, ALLOGRAFT, BBONE MARROW ASPIRATION AND ALL RELATED PROCEDURES.  COVID SCREEN

## 2021-03-26 NOTE — H&P PST ADULT - HISTORY OF PRESENT ILLNESS
54 yo female reports long history of lower back pain that exacerbated in July while she was trying to excersize.  Patient states that since that time she has been unable to walk greater than one half block without pain.  Pain radiates to posterior thighs, no numbness or tingling. 56 yo female reports long history of lower back pain that exacerbated in July while she was trying to excersize.  Patient states that since that time she has been unable to walk greater than one half block without pain and ambulates with a cane.   Pain radiates to posterior thighs, no numbness or tingling.  Mild relief with Tylenol Arthritis.

## 2021-03-31 DIAGNOSIS — N39.0 URINARY TRACT INFECTION, SITE NOT SPECIFIED: ICD-10-CM

## 2021-04-02 DIAGNOSIS — Z01.818 ENCOUNTER FOR OTHER PREPROCEDURAL EXAMINATION: ICD-10-CM

## 2021-04-02 NOTE — PROVIDER CONTACT NOTE (OTHER) - ASSESSMENT
The Spine Pre-Operative Education packet was given to the patient on 1/6/2021. The patient and I reviewed the information included in the packet. All her questions were answered and she gave a clear understanding of the instructions that were provided. She was advised to call the office at any time with further questions or concerns.

## 2021-04-03 ENCOUNTER — APPOINTMENT (OUTPATIENT)
Dept: DISASTER EMERGENCY | Facility: CLINIC | Age: 56
End: 2021-04-03

## 2021-04-04 LAB
APPEARANCE: CLEAR
BACTERIA: NEGATIVE
BILIRUBIN URINE: NEGATIVE
BLOOD URINE: NEGATIVE
COLOR: YELLOW
GLUCOSE QUALITATIVE U: NEGATIVE
HYALINE CASTS: 3 /LPF
KETONES URINE: NEGATIVE
LEUKOCYTE ESTERASE URINE: NEGATIVE
MICROSCOPIC-UA: NORMAL
NITRITE URINE: NEGATIVE
PH URINE: 5.5
PROTEIN URINE: NORMAL
RED BLOOD CELLS URINE: 8 /HPF
SARS-COV-2 N GENE NPH QL NAA+PROBE: NOT DETECTED
SPECIFIC GRAVITY URINE: 1.03
SQUAMOUS EPITHELIAL CELLS: 1 /HPF
UROBILINOGEN URINE: NORMAL
WHITE BLOOD CELLS URINE: 1 /HPF

## 2021-04-05 ENCOUNTER — TRANSCRIPTION ENCOUNTER (OUTPATIENT)
Age: 56
End: 2021-04-05

## 2021-04-05 NOTE — PATIENT PROFILE ADULT - NSPROGENOTHERPROVIDER_GEN_A_NUR
[id number] / Job#: [id number]
none

## 2021-04-06 ENCOUNTER — APPOINTMENT (OUTPATIENT)
Dept: ORTHOPEDIC SURGERY | Facility: HOSPITAL | Age: 56
End: 2021-04-06

## 2021-04-06 ENCOUNTER — INPATIENT (INPATIENT)
Facility: HOSPITAL | Age: 56
LOS: 1 days | Discharge: HOME | DRG: 455 | End: 2021-04-08
Attending: ORTHOPAEDIC SURGERY | Admitting: ORTHOPAEDIC SURGERY
Payer: COMMERCIAL

## 2021-04-06 ENCOUNTER — RESULT REVIEW (OUTPATIENT)
Age: 56
End: 2021-04-06

## 2021-04-06 VITALS
WEIGHT: 192.24 LBS | RESPIRATION RATE: 21 BRPM | DIASTOLIC BLOOD PRESSURE: 89 MMHG | HEART RATE: 78 BPM | SYSTOLIC BLOOD PRESSURE: 104 MMHG | HEIGHT: 63 IN | OXYGEN SATURATION: 99 % | TEMPERATURE: 98 F

## 2021-04-06 DIAGNOSIS — M43.10 SPONDYLOLISTHESIS, SITE UNSPECIFIED: ICD-10-CM

## 2021-04-06 DIAGNOSIS — Z01.818 ENCOUNTER FOR OTHER PREPROCEDURAL EXAMINATION: ICD-10-CM

## 2021-04-06 DIAGNOSIS — Z98.1 ARTHRODESIS STATUS: Chronic | ICD-10-CM

## 2021-04-06 DIAGNOSIS — M48.062 SPINAL STENOSIS, LUMBAR REGION WITH NEUROGENIC CLAUDICATION: ICD-10-CM

## 2021-04-06 LAB
BASOPHILS # BLD AUTO: 0.02 K/UL — SIGNIFICANT CHANGE UP (ref 0–0.2)
BASOPHILS NFR BLD AUTO: 0.2 % — SIGNIFICANT CHANGE UP (ref 0–2)
EOSINOPHIL # BLD AUTO: 0.03 K/UL — SIGNIFICANT CHANGE UP (ref 0–0.5)
EOSINOPHIL NFR BLD AUTO: 0.3 % — SIGNIFICANT CHANGE UP (ref 0–6)
GLUCOSE BLDC GLUCOMTR-MCNC: 135 MG/DL — HIGH (ref 70–99)
GLUCOSE BLDC GLUCOMTR-MCNC: 151 MG/DL — HIGH (ref 70–99)
HCG UR QL: NEGATIVE — SIGNIFICANT CHANGE UP
HCT VFR BLD CALC: 39.7 % — SIGNIFICANT CHANGE UP (ref 34.5–45)
HGB BLD-MCNC: 12.8 G/DL — SIGNIFICANT CHANGE UP (ref 11.5–15.5)
IMM GRANULOCYTES NFR BLD AUTO: 0.7 % — SIGNIFICANT CHANGE UP (ref 0–1.5)
LYMPHOCYTES # BLD AUTO: 0.96 K/UL — LOW (ref 1–3.3)
LYMPHOCYTES # BLD AUTO: 8.1 % — LOW (ref 13–44)
MCHC RBC-ENTMCNC: 28.2 PG — SIGNIFICANT CHANGE UP (ref 27–34)
MCHC RBC-ENTMCNC: 32.2 GM/DL — SIGNIFICANT CHANGE UP (ref 32–36)
MCV RBC AUTO: 87.4 FL — SIGNIFICANT CHANGE UP (ref 80–100)
MONOCYTES # BLD AUTO: 0.15 K/UL — SIGNIFICANT CHANGE UP (ref 0–0.9)
MONOCYTES NFR BLD AUTO: 1.3 % — LOW (ref 2–14)
NEUTROPHILS # BLD AUTO: 10.58 K/UL — HIGH (ref 1.8–7.4)
NEUTROPHILS NFR BLD AUTO: 89.4 % — HIGH (ref 43–77)
NRBC # BLD: 0 /100 WBCS — SIGNIFICANT CHANGE UP (ref 0–0)
PLATELET # BLD AUTO: 276 K/UL — SIGNIFICANT CHANGE UP (ref 150–400)
RBC # BLD: 4.54 M/UL — SIGNIFICANT CHANGE UP (ref 3.8–5.2)
RBC # FLD: 14.4 % — SIGNIFICANT CHANGE UP (ref 10.3–14.5)
WBC # BLD: 11.82 K/UL — HIGH (ref 3.8–10.5)
WBC # FLD AUTO: 11.82 K/UL — HIGH (ref 3.8–10.5)

## 2021-04-06 PROCEDURE — 22853 INSJ BIOMECHANICAL DEVICE: CPT | Mod: 82

## 2021-04-06 PROCEDURE — 22853 INSJ BIOMECHANICAL DEVICE: CPT

## 2021-04-06 PROCEDURE — 88304 TISSUE EXAM BY PATHOLOGIST: CPT | Mod: 26

## 2021-04-06 PROCEDURE — 22840 INSERT SPINE FIXATION DEVICE: CPT

## 2021-04-06 PROCEDURE — 88311 DECALCIFY TISSUE: CPT | Mod: 26

## 2021-04-06 PROCEDURE — 63267 EXCISE INTRSPINL LESION LMBR: CPT | Mod: 59

## 2021-04-06 PROCEDURE — 22633 ARTHRD CMBN 1NTRSPC LUMBAR: CPT

## 2021-04-06 PROCEDURE — 63267 EXCISE INTRSPINL LESION LMBR: CPT | Mod: 82,59

## 2021-04-06 PROCEDURE — 22840 INSERT SPINE FIXATION DEVICE: CPT | Mod: 82

## 2021-04-06 PROCEDURE — 22633 ARTHRD CMBN 1NTRSPC LUMBAR: CPT | Mod: 82

## 2021-04-06 PROCEDURE — 99223 1ST HOSP IP/OBS HIGH 75: CPT

## 2021-04-06 PROCEDURE — 20939 BONE MARROW ASPIR BONE GRFG: CPT

## 2021-04-06 RX ORDER — OXYCODONE HYDROCHLORIDE 5 MG/1
5 TABLET ORAL ONCE
Refills: 0 | Status: DISCONTINUED | OUTPATIENT
Start: 2021-04-06 | End: 2021-04-06

## 2021-04-06 RX ORDER — DIPHENHYDRAMINE HCL 50 MG
25 CAPSULE ORAL EVERY 8 HOURS
Refills: 0 | Status: DISCONTINUED | OUTPATIENT
Start: 2021-04-06 | End: 2021-04-08

## 2021-04-06 RX ORDER — APREPITANT 80 MG/1
40 CAPSULE ORAL ONCE
Refills: 0 | Status: COMPLETED | OUTPATIENT
Start: 2021-04-06 | End: 2021-04-06

## 2021-04-06 RX ORDER — SODIUM CHLORIDE 9 MG/ML
1000 INJECTION, SOLUTION INTRAVENOUS
Refills: 0 | Status: DISCONTINUED | OUTPATIENT
Start: 2021-04-06 | End: 2021-04-07

## 2021-04-06 RX ORDER — SODIUM CHLORIDE 9 MG/ML
1000 INJECTION, SOLUTION INTRAVENOUS
Refills: 0 | Status: DISCONTINUED | OUTPATIENT
Start: 2021-04-06 | End: 2021-04-06

## 2021-04-06 RX ORDER — DEXAMETHASONE 0.5 MG/5ML
8 ELIXIR ORAL EVERY 8 HOURS
Refills: 0 | Status: DISCONTINUED | OUTPATIENT
Start: 2021-04-06 | End: 2021-04-06

## 2021-04-06 RX ORDER — LISINOPRIL 2.5 MG/1
10 TABLET ORAL DAILY
Refills: 0 | Status: DISCONTINUED | OUTPATIENT
Start: 2021-04-08 | End: 2021-04-08

## 2021-04-06 RX ORDER — ONDANSETRON 8 MG/1
4 TABLET, FILM COATED ORAL EVERY 6 HOURS
Refills: 0 | Status: DISCONTINUED | OUTPATIENT
Start: 2021-04-06 | End: 2021-04-08

## 2021-04-06 RX ORDER — HYDROMORPHONE HYDROCHLORIDE 2 MG/ML
0.5 INJECTION INTRAMUSCULAR; INTRAVENOUS; SUBCUTANEOUS
Refills: 0 | Status: DISCONTINUED | OUTPATIENT
Start: 2021-04-06 | End: 2021-04-08

## 2021-04-06 RX ORDER — CEFAZOLIN SODIUM 1 G
2000 VIAL (EA) INJECTION EVERY 8 HOURS
Refills: 0 | Status: COMPLETED | OUTPATIENT
Start: 2021-04-06 | End: 2021-04-07

## 2021-04-06 RX ORDER — CEFAZOLIN SODIUM 1 G
2000 VIAL (EA) INJECTION ONCE
Refills: 0 | Status: COMPLETED | OUTPATIENT
Start: 2021-04-06 | End: 2021-04-06

## 2021-04-06 RX ORDER — HYDROMORPHONE HYDROCHLORIDE 2 MG/ML
0.5 INJECTION INTRAMUSCULAR; INTRAVENOUS; SUBCUTANEOUS
Refills: 0 | Status: DISCONTINUED | OUTPATIENT
Start: 2021-04-06 | End: 2021-04-06

## 2021-04-06 RX ORDER — SIMVASTATIN 20 MG/1
20 TABLET, FILM COATED ORAL AT BEDTIME
Refills: 0 | Status: DISCONTINUED | OUTPATIENT
Start: 2021-04-06 | End: 2021-04-08

## 2021-04-06 RX ORDER — TRANEXAMIC ACID 100 MG/ML
1400 INJECTION, SOLUTION INTRAVENOUS ONCE
Refills: 0 | Status: COMPLETED | OUTPATIENT
Start: 2021-04-06 | End: 2021-04-06

## 2021-04-06 RX ORDER — DEXAMETHASONE 0.5 MG/5ML
8 ELIXIR ORAL EVERY 8 HOURS
Refills: 0 | Status: COMPLETED | OUTPATIENT
Start: 2021-04-06 | End: 2021-04-07

## 2021-04-06 RX ORDER — DIAZEPAM 5 MG
5 TABLET ORAL EVERY 8 HOURS
Refills: 0 | Status: DISCONTINUED | OUTPATIENT
Start: 2021-04-06 | End: 2021-04-08

## 2021-04-06 RX ORDER — ONDANSETRON 8 MG/1
4 TABLET, FILM COATED ORAL ONCE
Refills: 0 | Status: DISCONTINUED | OUTPATIENT
Start: 2021-04-06 | End: 2021-04-06

## 2021-04-06 RX ORDER — ACETAMINOPHEN 500 MG
1000 TABLET ORAL ONCE
Refills: 0 | Status: COMPLETED | OUTPATIENT
Start: 2021-04-06 | End: 2021-04-06

## 2021-04-06 RX ORDER — ACETAMINOPHEN 500 MG
1000 TABLET ORAL ONCE
Refills: 0 | Status: COMPLETED | OUTPATIENT
Start: 2021-04-07 | End: 2021-04-07

## 2021-04-06 RX ORDER — ACETAMINOPHEN 500 MG
1000 TABLET ORAL EVERY 8 HOURS
Refills: 0 | Status: DISCONTINUED | OUTPATIENT
Start: 2021-04-07 | End: 2021-04-08

## 2021-04-06 RX ORDER — SENNA PLUS 8.6 MG/1
2 TABLET ORAL AT BEDTIME
Refills: 0 | Status: DISCONTINUED | OUTPATIENT
Start: 2021-04-06 | End: 2021-04-08

## 2021-04-06 RX ORDER — MAGNESIUM HYDROXIDE 400 MG/1
30 TABLET, CHEWABLE ORAL EVERY 12 HOURS
Refills: 0 | Status: DISCONTINUED | OUTPATIENT
Start: 2021-04-06 | End: 2021-04-08

## 2021-04-06 RX ORDER — HYDROMORPHONE HYDROCHLORIDE 2 MG/ML
30 INJECTION INTRAMUSCULAR; INTRAVENOUS; SUBCUTANEOUS
Refills: 0 | Status: DISCONTINUED | OUTPATIENT
Start: 2021-04-06 | End: 2021-04-07

## 2021-04-06 RX ORDER — ACETAMINOPHEN 500 MG
650 TABLET ORAL EVERY 8 HOURS
Refills: 0 | Status: DISCONTINUED | OUTPATIENT
Start: 2021-04-06 | End: 2021-04-06

## 2021-04-06 RX ADMIN — SODIUM CHLORIDE 75 MILLILITER(S): 9 INJECTION, SOLUTION INTRAVENOUS at 19:40

## 2021-04-06 RX ADMIN — SODIUM CHLORIDE 75 MILLILITER(S): 9 INJECTION, SOLUTION INTRAVENOUS at 21:44

## 2021-04-06 RX ADMIN — HYDROMORPHONE HYDROCHLORIDE 30 MILLILITER(S): 2 INJECTION INTRAMUSCULAR; INTRAVENOUS; SUBCUTANEOUS at 18:59

## 2021-04-06 RX ADMIN — HYDROMORPHONE HYDROCHLORIDE 0.5 MILLIGRAM(S): 2 INJECTION INTRAMUSCULAR; INTRAVENOUS; SUBCUTANEOUS at 19:17

## 2021-04-06 RX ADMIN — Medication 400 MILLIGRAM(S): at 23:50

## 2021-04-06 RX ADMIN — HYDROMORPHONE HYDROCHLORIDE 0.5 MILLIGRAM(S): 2 INJECTION INTRAMUSCULAR; INTRAVENOUS; SUBCUTANEOUS at 18:43

## 2021-04-06 RX ADMIN — HYDROMORPHONE HYDROCHLORIDE 0.5 MILLIGRAM(S): 2 INJECTION INTRAMUSCULAR; INTRAVENOUS; SUBCUTANEOUS at 19:00

## 2021-04-06 RX ADMIN — HYDROMORPHONE HYDROCHLORIDE 30 MILLILITER(S): 2 INJECTION INTRAMUSCULAR; INTRAVENOUS; SUBCUTANEOUS at 21:41

## 2021-04-06 RX ADMIN — APREPITANT 40 MILLIGRAM(S): 80 CAPSULE ORAL at 10:44

## 2021-04-06 RX ADMIN — HYDROMORPHONE HYDROCHLORIDE 0.5 MILLIGRAM(S): 2 INJECTION INTRAMUSCULAR; INTRAVENOUS; SUBCUTANEOUS at 19:28

## 2021-04-06 RX ADMIN — SIMVASTATIN 20 MILLIGRAM(S): 20 TABLET, FILM COATED ORAL at 22:03

## 2021-04-06 RX ADMIN — Medication 100 MILLIGRAM(S): at 22:34

## 2021-04-06 RX ADMIN — Medication 101.6 MILLIGRAM(S): at 21:59

## 2021-04-06 NOTE — CONSULT NOTE ADULT - ASSESSMENT
55yFemale  with DM2 not on insulin, LAMBERTO not on CPAP, disc disease who presents electively for lumbar surgery.      Lumbar surgery  - No AC DVT ppx   - pain control as needed  - PT/OT  - advance diet as tolerated  - anti-emetics PRN  - incentive spirometer  - bowel regiment    DM2   - hold oral meds  - start low dose insulin sliding scale with FS qAC and qHS  - diabetic diet    LAMBERTO  - monitor on telemetry    Preventive measures  - no AC for DVT ppx  - SCD only

## 2021-04-06 NOTE — BRIEF OPERATIVE NOTE - NSICDXBRIEFPREOP_GEN_ALL_CORE_FT
PRE-OP DIAGNOSIS:  Lumbar spinal stenosis 06-Apr-2021 18:02:14  Jaimee Rodas  Spondylolisthesis at L4-L5 level 06-Apr-2021 18:01:57  Jaimee Rodas

## 2021-04-06 NOTE — BRIEF OPERATIVE NOTE - COMMENTS
Baker catheter  TLIF L4-L5 with decompression  nylon closure Baker catheter  TLIF L4-L5 with decompression  nylon closure  Patient will require lumbar corset brace post-op

## 2021-04-06 NOTE — CONSULT NOTE ADULT - SUBJECTIVE AND OBJECTIVE BOX
HPI:  55yFemale  with DM2 not on insulin, LAMBERTO not on CPAP, disc disease who presents electively for lumbar surgery.  Patient has been suffering with back pain but symptoms worsened last July when she attempted to exercise.  Pain with radiation down both her posterior thighs.  Tried conservative measures but was now recommended to have surgery.  Patient seen in PACU.  Currently pain is controlled.  No numbness or weakness in her BLE.  Denies any nausea.  Has indwelling preston.      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  Hypercholesterolemia  LAMBERTO (Obstructive Sleep Apnea) signs and symptoms and never tested  Diabetes Mellitus since 2008  -140  Anemia mild  Vertigo occurs with allergies  Seasonal allergies  S/P Myomectomy 2000  S/P cervical spinal fusion 2011, 2018      REVIEW OF SYSTEMS:  CONSTITUTIONAL:    no fever or weight loss or fatigue  EYES:    no eye pain or visual disturbances or discharge  ENMT:     no difficulty hearing or tinnitus or vertigo, no sinus or throat pain  NECK:    no pain or stiffness  RESPIRATORY:    no cough or wheezing or chills or hemoptysis, no shortness of breath  CARDIOVASCULAR:    no chest pain or palpitations or dizziness or leg swelling  GASTROINTESTINAL:    no abdominal or epigastric pain. no nausea or vomiting or hematemesis, no diarrhea or constipation. no melena or hematochezia.  GENITOURINARY:    no dysuria or frequency or hematuria or incontinence  NEUROLOGICAL:    no headaches or memory loss or loss of strength or numbness or tremors  SKIN:    no itching or burning or rashes or lesions   LYMPH NODES:    no enlarged glands  ENDOCRINE:    no heat or cold intolerance, no hair loss, no polydipsia or polyuria  MUSCULOSKELETAL:    slight back pain  PSYCHIATRIC:    no depression or anxiety or mood swings or difficulty sleeping  HEME/LYMPH:    no easy bruising or bleeding gums  ALLERGY AND IMMUNOLOGIC:    no hives or eczema      HOME MEDICATIONS:    · 	acetaminophen 325 mg oral tablet: Last Dose Taken:  , 2 tab(s) orally every 6 hours, As needed, For Temp over 37.9 C (100.2 F)  · 	simvastatin 20 mg oral tablet: Last Dose Taken:  , 1 tab(s) orally once a day (at bedtime)  · 	ZyrTEC-D 5 mg-120 mg oral tablet, extended release: Last Dose Taken:  , 1 tab(s) orally once a day  · 	glipiZIDE 2.5 mg oral tablet, extended release: Last Dose Taken:  , 1 tab(s) orally once a day          · 	lisinopril 10 mg oral tablet: Last Dose Taken:  , 1 tab(s) orally once a day    ALLERGIES and INTOLERANCES:  eggs (Nausea)  No Known Drug Allergies    SOCIAL HISTORY:  + smokes cigars (1-2x/day, last used in February)  - no etoh use   - lives with parents    FAMILY HISTORY:  Family history of DVT (Mother, Sibling)  Family history of type 2 diabetes mellitus in father (Father)  Family history of hypertension in father (Father)  Family history of breast cancer in sister (Sibling)    PHYSICAL EXAM:  Vital Signs Last 24 Hrs  T(C): 37.3 (06 Apr 2021 18:30), Max: 37.3 (06 Apr 2021 18:30)  T(F): 99.1 (06 Apr 2021 18:30), Max: 99.1 (06 Apr 2021 18:30)  HR: 68 (06 Apr 2021 20:15) (68 - 98)  BP: 126/59 (06 Apr 2021 20:15) (104/89 - 143/73)  BP(mean): --  RR: 13 (06 Apr 2021 20:15) (13 - 21)  SpO2: 100% (06 Apr 2021 20:15) (99% - 100%)    GENERAL:     overweight female in NAD  HEAD:     small red raised lesion on center of forehead (patient did not know it was there)   EYES:     EOMI, conjunctiva and sclera clear  ENMT:     no tonsillar erythema or exudates or enlargement, no oral lesions, moist mucous membranes, good dentition  NECK:     supple, no JVD  RESPIRATORY:     clear to auscultation bilaterally, no rales or rhonchi or wheezing or rubs  CARDIOVASCULAR:     regular rate and rhythm, no murmurs or rubs or gallops, 2+ peripheral pulses  GASTROINTESTINAL:     soft, nontender, nondistended, no hepatosplenomegaly palpated, bowel sounds present  EXTREMITIES:     no clubbing or cyanosis or edema  MUSCULOSKELETAL:     no joint pain or swelling or deformities  NERVOUS SYSTEM:     motor strength intact with 5/5 B/L upper and lower extremities, no gross sensory deficits  SKIN:     see above  PSYCH:     appropriate, alert and orientated x3, good concentration      LABS:                        12.8   11.82<H> )-----------( 276      ( 06 Apr 2021 19:50 )             39.7     RADIOLOGY & ADDITIONAL STUDIES:

## 2021-04-06 NOTE — BRIEF OPERATIVE NOTE - NSICDXBRIEFPOSTOP_GEN_ALL_CORE_FT
POST-OP DIAGNOSIS:  Spondylolisthesis at L4-L5 level 06-Apr-2021 18:02:38  Jaimee Rodas  Lumbar spinal stenosis 06-Apr-2021 18:02:34  Jaimee Rodas

## 2021-04-07 ENCOUNTER — TRANSCRIPTION ENCOUNTER (OUTPATIENT)
Age: 56
End: 2021-04-07

## 2021-04-07 LAB
ANION GAP SERPL CALC-SCNC: 9 MMOL/L — SIGNIFICANT CHANGE UP (ref 5–17)
BASOPHILS # BLD AUTO: 0.01 K/UL — SIGNIFICANT CHANGE UP (ref 0–0.2)
BASOPHILS NFR BLD AUTO: 0.1 % — SIGNIFICANT CHANGE UP (ref 0–2)
BUN SERPL-MCNC: 15 MG/DL — SIGNIFICANT CHANGE UP (ref 7–23)
CALCIUM SERPL-MCNC: 9.6 MG/DL — SIGNIFICANT CHANGE UP (ref 8.4–10.5)
CHLORIDE SERPL-SCNC: 101 MMOL/L — SIGNIFICANT CHANGE UP (ref 96–108)
CO2 SERPL-SCNC: 27 MMOL/L — SIGNIFICANT CHANGE UP (ref 22–31)
COVID-19 SPIKE DOMAIN AB INTERP: POSITIVE
COVID-19 SPIKE DOMAIN ANTIBODY RESULT: >250 U/ML — HIGH
CREAT SERPL-MCNC: 0.86 MG/DL — SIGNIFICANT CHANGE UP (ref 0.5–1.3)
EOSINOPHIL # BLD AUTO: 0 K/UL — SIGNIFICANT CHANGE UP (ref 0–0.5)
EOSINOPHIL NFR BLD AUTO: 0 % — SIGNIFICANT CHANGE UP (ref 0–6)
GLUCOSE BLDC GLUCOMTR-MCNC: 133 MG/DL — HIGH (ref 70–99)
GLUCOSE BLDC GLUCOMTR-MCNC: 173 MG/DL — HIGH (ref 70–99)
GLUCOSE BLDC GLUCOMTR-MCNC: 187 MG/DL — HIGH (ref 70–99)
GLUCOSE BLDC GLUCOMTR-MCNC: 221 MG/DL — HIGH (ref 70–99)
GLUCOSE SERPL-MCNC: 175 MG/DL — HIGH (ref 70–99)
HCT VFR BLD CALC: 38.2 % — SIGNIFICANT CHANGE UP (ref 34.5–45)
HGB BLD-MCNC: 12.6 G/DL — SIGNIFICANT CHANGE UP (ref 11.5–15.5)
IMM GRANULOCYTES NFR BLD AUTO: 0.6 % — SIGNIFICANT CHANGE UP (ref 0–1.5)
LYMPHOCYTES # BLD AUTO: 0.72 K/UL — LOW (ref 1–3.3)
LYMPHOCYTES # BLD AUTO: 5.5 % — LOW (ref 13–44)
MCHC RBC-ENTMCNC: 28.6 PG — SIGNIFICANT CHANGE UP (ref 27–34)
MCHC RBC-ENTMCNC: 33 GM/DL — SIGNIFICANT CHANGE UP (ref 32–36)
MCV RBC AUTO: 86.8 FL — SIGNIFICANT CHANGE UP (ref 80–100)
MONOCYTES # BLD AUTO: 0.53 K/UL — SIGNIFICANT CHANGE UP (ref 0–0.9)
MONOCYTES NFR BLD AUTO: 4 % — SIGNIFICANT CHANGE UP (ref 2–14)
NEUTROPHILS # BLD AUTO: 11.77 K/UL — HIGH (ref 1.8–7.4)
NEUTROPHILS NFR BLD AUTO: 89.8 % — HIGH (ref 43–77)
NRBC # BLD: 0 /100 WBCS — SIGNIFICANT CHANGE UP (ref 0–0)
PLATELET # BLD AUTO: 309 K/UL — SIGNIFICANT CHANGE UP (ref 150–400)
POTASSIUM SERPL-MCNC: 4.3 MMOL/L — SIGNIFICANT CHANGE UP (ref 3.5–5.3)
POTASSIUM SERPL-SCNC: 4.3 MMOL/L — SIGNIFICANT CHANGE UP (ref 3.5–5.3)
RBC # BLD: 4.4 M/UL — SIGNIFICANT CHANGE UP (ref 3.8–5.2)
RBC # FLD: 14.2 % — SIGNIFICANT CHANGE UP (ref 10.3–14.5)
SARS-COV-2 IGG+IGM SERPL QL IA: >250 U/ML — HIGH
SARS-COV-2 IGG+IGM SERPL QL IA: POSITIVE
SARS-COV-2 RNA SPEC QL NAA+PROBE: SIGNIFICANT CHANGE UP
SODIUM SERPL-SCNC: 137 MMOL/L — SIGNIFICANT CHANGE UP (ref 135–145)
WBC # BLD: 13.11 K/UL — HIGH (ref 3.8–10.5)
WBC # FLD AUTO: 13.11 K/UL — HIGH (ref 3.8–10.5)

## 2021-04-07 PROCEDURE — 99232 SBSQ HOSP IP/OBS MODERATE 35: CPT

## 2021-04-07 RX ORDER — HYDROMORPHONE HYDROCHLORIDE 2 MG/ML
1 INJECTION INTRAMUSCULAR; INTRAVENOUS; SUBCUTANEOUS
Qty: 42 | Refills: 0
Start: 2021-04-07 | End: 2021-04-13

## 2021-04-07 RX ORDER — ACETAMINOPHEN 500 MG
2 TABLET ORAL
Qty: 0 | Refills: 0 | DISCHARGE
Start: 2021-04-07

## 2021-04-07 RX ORDER — HYDROMORPHONE HYDROCHLORIDE 2 MG/ML
4 INJECTION INTRAMUSCULAR; INTRAVENOUS; SUBCUTANEOUS
Refills: 0 | Status: DISCONTINUED | OUTPATIENT
Start: 2021-04-07 | End: 2021-04-08

## 2021-04-07 RX ORDER — HYDROMORPHONE HYDROCHLORIDE 2 MG/ML
2 INJECTION INTRAMUSCULAR; INTRAVENOUS; SUBCUTANEOUS
Refills: 0 | Status: DISCONTINUED | OUTPATIENT
Start: 2021-04-07 | End: 2021-04-08

## 2021-04-07 RX ORDER — SODIUM CHLORIDE 9 MG/ML
1000 INJECTION, SOLUTION INTRAVENOUS
Refills: 0 | Status: DISCONTINUED | OUTPATIENT
Start: 2021-04-07 | End: 2021-04-08

## 2021-04-07 RX ORDER — SENNA PLUS 8.6 MG/1
2 TABLET ORAL
Qty: 0 | Refills: 0 | DISCHARGE
Start: 2021-04-07

## 2021-04-07 RX ORDER — INSULIN LISPRO 100/ML
VIAL (ML) SUBCUTANEOUS
Refills: 0 | Status: DISCONTINUED | OUTPATIENT
Start: 2021-04-07 | End: 2021-04-08

## 2021-04-07 RX ADMIN — Medication 101.6 MILLIGRAM(S): at 06:07

## 2021-04-07 RX ADMIN — SIMVASTATIN 20 MILLIGRAM(S): 20 TABLET, FILM COATED ORAL at 20:44

## 2021-04-07 RX ADMIN — HYDROMORPHONE HYDROCHLORIDE 2 MILLIGRAM(S): 2 INJECTION INTRAMUSCULAR; INTRAVENOUS; SUBCUTANEOUS at 11:51

## 2021-04-07 RX ADMIN — Medication 1: at 17:06

## 2021-04-07 RX ADMIN — HYDROMORPHONE HYDROCHLORIDE 30 MILLILITER(S): 2 INJECTION INTRAMUSCULAR; INTRAVENOUS; SUBCUTANEOUS at 07:13

## 2021-04-07 RX ADMIN — HYDROMORPHONE HYDROCHLORIDE 4 MILLIGRAM(S): 2 INJECTION INTRAMUSCULAR; INTRAVENOUS; SUBCUTANEOUS at 23:32

## 2021-04-07 RX ADMIN — SODIUM CHLORIDE 100 MILLILITER(S): 9 INJECTION, SOLUTION INTRAVENOUS at 05:23

## 2021-04-07 RX ADMIN — HYDROMORPHONE HYDROCHLORIDE 2 MILLIGRAM(S): 2 INJECTION INTRAMUSCULAR; INTRAVENOUS; SUBCUTANEOUS at 19:15

## 2021-04-07 RX ADMIN — SENNA PLUS 2 TABLET(S): 8.6 TABLET ORAL at 20:44

## 2021-04-07 RX ADMIN — Medication 1000 MILLIGRAM(S): at 00:00

## 2021-04-07 RX ADMIN — Medication 100 MILLIGRAM(S): at 06:35

## 2021-04-07 RX ADMIN — HYDROMORPHONE HYDROCHLORIDE 2 MILLIGRAM(S): 2 INJECTION INTRAMUSCULAR; INTRAVENOUS; SUBCUTANEOUS at 09:50

## 2021-04-07 RX ADMIN — HYDROMORPHONE HYDROCHLORIDE 4 MILLIGRAM(S): 2 INJECTION INTRAMUSCULAR; INTRAVENOUS; SUBCUTANEOUS at 22:58

## 2021-04-07 RX ADMIN — Medication 1000 MILLIGRAM(S): at 20:50

## 2021-04-07 RX ADMIN — Medication 400 MILLIGRAM(S): at 05:22

## 2021-04-07 RX ADMIN — Medication 1000 MILLIGRAM(S): at 05:41

## 2021-04-07 RX ADMIN — HYDROMORPHONE HYDROCHLORIDE 2 MILLIGRAM(S): 2 INJECTION INTRAMUSCULAR; INTRAVENOUS; SUBCUTANEOUS at 08:59

## 2021-04-07 RX ADMIN — Medication 2: at 12:10

## 2021-04-07 RX ADMIN — Medication 1000 MILLIGRAM(S): at 11:53

## 2021-04-07 RX ADMIN — Medication 1000 MILLIGRAM(S): at 20:44

## 2021-04-07 RX ADMIN — HYDROMORPHONE HYDROCHLORIDE 2 MILLIGRAM(S): 2 INJECTION INTRAMUSCULAR; INTRAVENOUS; SUBCUTANEOUS at 20:00

## 2021-04-07 RX ADMIN — HYDROMORPHONE HYDROCHLORIDE 2 MILLIGRAM(S): 2 INJECTION INTRAMUSCULAR; INTRAVENOUS; SUBCUTANEOUS at 12:32

## 2021-04-07 RX ADMIN — Medication 1000 MILLIGRAM(S): at 12:05

## 2021-04-07 NOTE — DISCHARGE NOTE PROVIDER - HOSPITAL COURSE
This 56yo female patient was admitted to [facility] on 4/6/21 with a history of low back pain due to lumbar spinal stenosis and spondylolisthesis. Patient had appropriate preop medical evaluation and testing.    Patient received antibiotics according to SCIP guidelines for infection prevention.  The patient underwent an uncomplicated TLIF (instrumentation and fusion at L4-5) by Dr. Apollo Tracy on 4/6.  PAS were ordered for DVT prophylaxis.  Anesthesia, Medical Hospitalist, Physical Therapy and Occupational Therapy were consulted.  Patient is stable for discharge home with a good prognosis on 4/8/21.  Appropriate discharge instructions and medications are provided in this document.

## 2021-04-07 NOTE — DISCHARGE NOTE PROVIDER - NSDCFUADDINST_GEN_ALL_CORE_FT
Call your doctor if you experience:  • An increase in pain not controlled by pain medication or change in activity or  position.  • Temperature greater than 101° F.  • Redness, increased swelling or foul smelling drainage from or around the  incision.  • New numbness, weakness or pain in back or limbs.  • Call your doctor immediately if you experience chest pain, shortness of breath or calf pain.

## 2021-04-07 NOTE — DISCHARGE NOTE PROVIDER - NSDCMRMEDTOKEN_GEN_ALL_CORE_FT
3-1 commode: s/p TLIF L4-5  acetaminophen 325 mg oral tablet: 2 tab(s) orally every 6 hours, As needed, For Temp over 37.9 C (100.2 F)  glipiZIDE 2.5 mg oral tablet, extended release: 1 tab(s) orally once a day  lisinopril 10 mg oral tablet: 1 tab(s) orally once a day  LSO: s/p TLIF L4-5  Rolling walker with 5 inch wheels: s/p TLIF L4-5  simvastatin 20 mg oral tablet: 1 tab(s) orally once a day (at bedtime)  ZyrTEC-D 5 mg-120 mg oral tablet, extended release: 1 tab(s) orally once a day

## 2021-04-07 NOTE — OCCUPATIONAL THERAPY INITIAL EVALUATION ADULT - ADL RETRAINING, OT EVAL
Patient will dress upper body Independently w/in 1-2 sessions. 2. Patient will dress lower body I'ly , AE as needed w/in 1-2 sessions.

## 2021-04-07 NOTE — CHART NOTE - NSCHARTNOTEFT_GEN_A_CORE
Order received from ortho PA to fit patient with LSO following surgery  Pt measured and instructed to kenneth and ousmane  Written instructions left at bedside  Follow up if needed    Jeremy Dyer [facility]  [phone]

## 2021-04-07 NOTE — PHYSICAL THERAPY INITIAL EVALUATION ADULT - ACTIVE RANGE OF MOTION EXAMINATION, REHAB EVAL
eben. upper extremity Active ROM was WNL (within normal limits)/bilateral lower extremity Active ROM was WNL (within normal limits)

## 2021-04-07 NOTE — DISCHARGE NOTE PROVIDER - NSDCCPCAREPLAN_GEN_ALL_CORE_FT
PRINCIPAL DISCHARGE DIAGNOSIS  Diagnosis: Lumbar foraminal stenosis  Assessment and Plan of Treatment: with spondylolistheis at L4-5.

## 2021-04-07 NOTE — DISCHARGE NOTE PROVIDER - INSTRUCTIONS
Consistent carbohydrate diet. Keep excellent control of blood sugars for better wound healing.  For Constipation :   • Increase your daily water intake.   • Try adding fiber to your diet by eating fruits, vegetables and foods that are rich in grains.  • If you do experience constipation, you may take an over-the-counter stool softener/laxative such as Colace, Senokot , Milk of Magnesia or Miralax.

## 2021-04-07 NOTE — DISCHARGE NOTE PROVIDER - NSDCCPTREATMENT_GEN_ALL_CORE_FT
PRINCIPAL PROCEDURE  Procedure: TLIF, 1 level  Findings and Treatment: at L4-5.  You have just had spine surgery.  Please take care of your back by adhering to some basic recommendations.  Your surgeon recommends taking acetaminophen (Tylenol) 1000mg 3 times per day for the next 10 days.  This can help to minimize the need for stronger medications.  No heavy lifting. Do not lift more than 10 pounds.  Avoid twisting and bending over at waist.  Do NOT drive a car.  You may be driven in a car.      You may shower if the dressing is the clear plastic type or if you cover the existing dressing with plastic and tape to prevent it from getting wet.  Change dressing with dry, sterile gauze and tape if it becomes loose, wet or soiled.      Observe low back precautions as described by the Physical Therapist.  Walking is your best exercise.  Must wear lumbar brace when out of bed.  It is best not to remain in one position for long periods such as long car rides.       Constipation is a common problem associated with surgery and pain medications.  You should ensure that you are drinking plenty of fluids and increasing your fruit and vegetable intake.  Over the counter preparations such as colace, senokot and milk of magnesia may be helpful in relieving and prevention this problem.  Smoking should be avoided.  Tobacco products are associated with back problems.             Call the doctor with questions, fevers, severe headache, For Constipation :   • Increase your daily water intake.   • Try adding fiber to your diet by eating fruits, vegetables and foods that are rich in grains.  • If you do experience constipation, you may take an over-the-counter stool softener/laxative such as Colace, Senokot , Milk of Magnesia or Miralax. or bladder dysfunction, new numbness/weakness or new pain not relieved by medication, ice pack and change of position.

## 2021-04-07 NOTE — PHYSICAL THERAPY INITIAL EVALUATION ADULT - ADDITIONAL COMMENTS
Pt lives with parents in a house with 4 RHONDA, +HR. Inside, pt has 2 flights of stairs to get to her bedroom +HR. Pt reports using a cane to ambulate occasionally prior to surgery due to decreased balance. Pt reports being independent with all ADLs prior to surgery.

## 2021-04-07 NOTE — OCCUPATIONAL THERAPY INITIAL EVALUATION ADULT - ADDITIONAL COMMENTS
Lives w/ parents in  a private home 4 RHONDA +railing and 2 flights of stairs +railing  +stall shower pt has a comfort ht toilet

## 2021-04-07 NOTE — DISCHARGE NOTE PROVIDER - CARE PROVIDER_API CALL
Genaro Tracy)  Orthopaedic Surgery  [address], [address]  [place] [address]  Phone: [phone]  Fax: [phone]  Scheduled Appointment: 04/19/2021 10:15 AM

## 2021-04-08 ENCOUNTER — TRANSCRIPTION ENCOUNTER (OUTPATIENT)
Age: 56
End: 2021-04-08

## 2021-04-08 VITALS
TEMPERATURE: 98 F | SYSTOLIC BLOOD PRESSURE: 100 MMHG | DIASTOLIC BLOOD PRESSURE: 64 MMHG | RESPIRATION RATE: 18 BRPM | OXYGEN SATURATION: 97 % | HEART RATE: 63 BPM

## 2021-04-08 LAB
ANION GAP SERPL CALC-SCNC: 8 MMOL/L — SIGNIFICANT CHANGE UP (ref 5–17)
BASOPHILS # BLD AUTO: 0.03 K/UL — SIGNIFICANT CHANGE UP (ref 0–0.2)
BASOPHILS NFR BLD AUTO: 0.2 % — SIGNIFICANT CHANGE UP (ref 0–2)
BUN SERPL-MCNC: 24 MG/DL — HIGH (ref 7–23)
CALCIUM SERPL-MCNC: 9.2 MG/DL — SIGNIFICANT CHANGE UP (ref 8.4–10.5)
CHLORIDE SERPL-SCNC: 103 MMOL/L — SIGNIFICANT CHANGE UP (ref 96–108)
CO2 SERPL-SCNC: 27 MMOL/L — SIGNIFICANT CHANGE UP (ref 22–31)
CREAT SERPL-MCNC: 0.98 MG/DL — SIGNIFICANT CHANGE UP (ref 0.5–1.3)
EOSINOPHIL # BLD AUTO: 0.02 K/UL — SIGNIFICANT CHANGE UP (ref 0–0.5)
EOSINOPHIL NFR BLD AUTO: 0.2 % — SIGNIFICANT CHANGE UP (ref 0–6)
GLUCOSE BLDC GLUCOMTR-MCNC: 149 MG/DL — HIGH (ref 70–99)
GLUCOSE BLDC GLUCOMTR-MCNC: 235 MG/DL — HIGH (ref 70–99)
GLUCOSE SERPL-MCNC: 164 MG/DL — HIGH (ref 70–99)
HCT VFR BLD CALC: 35.7 % — SIGNIFICANT CHANGE UP (ref 34.5–45)
HGB BLD-MCNC: 11.4 G/DL — LOW (ref 11.5–15.5)
IMM GRANULOCYTES NFR BLD AUTO: 0.5 % — SIGNIFICANT CHANGE UP (ref 0–1.5)
LYMPHOCYTES # BLD AUTO: 1.53 K/UL — SIGNIFICANT CHANGE UP (ref 1–3.3)
LYMPHOCYTES # BLD AUTO: 12.6 % — LOW (ref 13–44)
MCHC RBC-ENTMCNC: 28.1 PG — SIGNIFICANT CHANGE UP (ref 27–34)
MCHC RBC-ENTMCNC: 31.9 GM/DL — LOW (ref 32–36)
MCV RBC AUTO: 88.1 FL — SIGNIFICANT CHANGE UP (ref 80–100)
MONOCYTES # BLD AUTO: 0.89 K/UL — SIGNIFICANT CHANGE UP (ref 0–0.9)
MONOCYTES NFR BLD AUTO: 7.3 % — SIGNIFICANT CHANGE UP (ref 2–14)
NEUTROPHILS # BLD AUTO: 9.59 K/UL — HIGH (ref 1.8–7.4)
NEUTROPHILS NFR BLD AUTO: 79.2 % — HIGH (ref 43–77)
NRBC # BLD: 0 /100 WBCS — SIGNIFICANT CHANGE UP (ref 0–0)
PLATELET # BLD AUTO: 266 K/UL — SIGNIFICANT CHANGE UP (ref 150–400)
POTASSIUM SERPL-MCNC: 4.3 MMOL/L — SIGNIFICANT CHANGE UP (ref 3.5–5.3)
POTASSIUM SERPL-SCNC: 4.3 MMOL/L — SIGNIFICANT CHANGE UP (ref 3.5–5.3)
RBC # BLD: 4.05 M/UL — SIGNIFICANT CHANGE UP (ref 3.8–5.2)
RBC # FLD: 14.6 % — HIGH (ref 10.3–14.5)
SODIUM SERPL-SCNC: 138 MMOL/L — SIGNIFICANT CHANGE UP (ref 135–145)
WBC # BLD: 12.12 K/UL — HIGH (ref 3.8–10.5)
WBC # FLD AUTO: 12.12 K/UL — HIGH (ref 3.8–10.5)

## 2021-04-08 PROCEDURE — 88311 DECALCIFY TISSUE: CPT

## 2021-04-08 PROCEDURE — 85025 COMPLETE CBC W/AUTO DIFF WBC: CPT

## 2021-04-08 PROCEDURE — 82962 GLUCOSE BLOOD TEST: CPT

## 2021-04-08 PROCEDURE — 99232 SBSQ HOSP IP/OBS MODERATE 35: CPT

## 2021-04-08 PROCEDURE — 97165 OT EVAL LOW COMPLEX 30 MIN: CPT

## 2021-04-08 PROCEDURE — 97535 SELF CARE MNGMENT TRAINING: CPT

## 2021-04-08 PROCEDURE — 86769 SARS-COV-2 COVID-19 ANTIBODY: CPT

## 2021-04-08 PROCEDURE — 97116 GAIT TRAINING THERAPY: CPT

## 2021-04-08 PROCEDURE — 97110 THERAPEUTIC EXERCISES: CPT

## 2021-04-08 PROCEDURE — C1889: CPT

## 2021-04-08 PROCEDURE — U0005: CPT

## 2021-04-08 PROCEDURE — 36415 COLL VENOUS BLD VENIPUNCTURE: CPT

## 2021-04-08 PROCEDURE — 88304 TISSUE EXAM BY PATHOLOGIST: CPT

## 2021-04-08 PROCEDURE — 97530 THERAPEUTIC ACTIVITIES: CPT

## 2021-04-08 PROCEDURE — 81025 URINE PREGNANCY TEST: CPT

## 2021-04-08 PROCEDURE — C1713: CPT

## 2021-04-08 PROCEDURE — 80048 BASIC METABOLIC PNL TOTAL CA: CPT

## 2021-04-08 PROCEDURE — U0003: CPT

## 2021-04-08 PROCEDURE — 76000 FLUOROSCOPY <1 HR PHYS/QHP: CPT

## 2021-04-08 RX ORDER — HYDROMORPHONE HYDROCHLORIDE 2 MG/ML
1 INJECTION INTRAMUSCULAR; INTRAVENOUS; SUBCUTANEOUS
Qty: 0 | Refills: 0 | DISCHARGE
Start: 2021-04-08 | End: 2021-04-14

## 2021-04-08 RX ORDER — HYDROMORPHONE HYDROCHLORIDE 2 MG/ML
1 INJECTION INTRAMUSCULAR; INTRAVENOUS; SUBCUTANEOUS
Qty: 42 | Refills: 0
Start: 2021-04-08 | End: 2021-04-14

## 2021-04-08 RX ADMIN — HYDROMORPHONE HYDROCHLORIDE 4 MILLIGRAM(S): 2 INJECTION INTRAMUSCULAR; INTRAVENOUS; SUBCUTANEOUS at 05:29

## 2021-04-08 RX ADMIN — ONDANSETRON 4 MILLIGRAM(S): 8 TABLET, FILM COATED ORAL at 05:50

## 2021-04-08 RX ADMIN — HYDROMORPHONE HYDROCHLORIDE 4 MILLIGRAM(S): 2 INJECTION INTRAMUSCULAR; INTRAVENOUS; SUBCUTANEOUS at 06:03

## 2021-04-08 RX ADMIN — LISINOPRIL 10 MILLIGRAM(S): 2.5 TABLET ORAL at 05:29

## 2021-04-08 RX ADMIN — Medication 1000 MILLIGRAM(S): at 13:15

## 2021-04-08 RX ADMIN — Medication 1000 MILLIGRAM(S): at 05:29

## 2021-04-08 RX ADMIN — Medication 2: at 11:47

## 2021-04-08 RX ADMIN — Medication 1000 MILLIGRAM(S): at 13:16

## 2021-04-08 RX ADMIN — Medication 1000 MILLIGRAM(S): at 05:36

## 2021-04-08 NOTE — PROGRESS NOTE ADULT - ASSESSMENT
55yFemale  with DM2 not on insulin, LAMBERTO not on CPAP, disc disease who presents electively for lumbar surgery.      Lumbar surgery  - PT/OT with lumbar support brace  - pain control as per ortho team  - advance diet as tolerated  - anti-emetics PRN  - incentive spirometer  - bowel regimen  - medically cleared for discharge    DM2   - hold oral meds while admitted  - continue low dose insulin sliding scale with FS qAC and qHS  - diabetic diet    LAMBERTO  - monitor on telemetry overnight    Preventive measures  - no AC for DVT ppx  - SCD only
55yFemale  with DM2 not on insulin, LAMBERTO not on CPAP, disc disease who presents electively for lumbar surgery.      Lumbar surgery  - PT/OT with lumbar support brace  - pain control as per ortho team  - advance diet as tolerated  - anti-emetics PRN  - incentive spirometer  - bowel regimen    DM2   - hold oral meds while admitted  - continue low dose insulin sliding scale with FS qAC and qHS  - diabetic diet    LAMBERTO  - monitor on telemetry overnight    Preventive measures  - no AC for DVT ppx  - SCD only

## 2021-04-08 NOTE — DISCHARGE NOTE NURSING/CASE MANAGEMENT/SOCIAL WORK - PATIENT PORTAL LINK FT
You can access the FollowMyHealth Patient Portal offered by [facility] by registering at the following website: [email or web address]. By joining [facility]’s FollowMyHealth portal, you will also be able to view your health information using other applications (apps) compatible with our system.

## 2021-04-08 NOTE — PHARMACOTHERAPY INTERVENTION NOTE - COMMENTS
Transition of Care video discharge education - medication calendar given to patient Pharmacy fill confirmed.
Admission medication reconciliation POD1

## 2021-04-08 NOTE — PROGRESS NOTE ADULT - SUBJECTIVE AND OBJECTIVE BOX
Patient is a 55y old  Female who presents with a chief complaint of Chronic low back pain with lumbar stenosis and spondylolisthesis (07 Apr 2021 13:36)      INTERVAL HPI/OVERNIGHT EVENTS:    saw at bedside.  some nausea overnight, but feeling fine.  No Bms, but passing gas.     MEDICATIONS  (STANDING):  acetaminophen   Tablet .. 1000 milliGRAM(s) Oral every 8 hours  dextrose 40% Gel 15 Gram(s) Oral once  dextrose 5%. 1000 milliLiter(s) (50 mL/Hr) IV Continuous <Continuous>  dextrose 5%. 1000 milliLiter(s) (100 mL/Hr) IV Continuous <Continuous>  dextrose 50% Injectable 25 Gram(s) IV Push once  dextrose 50% Injectable 12.5 Gram(s) IV Push once  dextrose 50% Injectable 25 Gram(s) IV Push once  glucagon  Injectable 1 milliGRAM(s) IntraMuscular once  insulin lispro (ADMELOG) corrective regimen sliding scale   SubCutaneous Before meals and at bedtime  lisinopril 10 milliGRAM(s) Oral daily  senna 2 Tablet(s) Oral at bedtime  simvastatin 20 milliGRAM(s) Oral at bedtime    MEDICATIONS  (PRN):  diazepam    Tablet 5 milliGRAM(s) Oral every 8 hours PRN muscle spasms  diphenhydrAMINE 25 milliGRAM(s) Oral every 8 hours PRN Pruritus  HYDROmorphone   Tablet 2 milliGRAM(s) Oral every 3 hours PRN Moderate Pain (4 - 6)  HYDROmorphone   Tablet 4 milliGRAM(s) Oral every 3 hours PRN Severe Pain (7 - 10)  HYDROmorphone  Injectable 0.5 milliGRAM(s) IV Push every 3 hours PRN Severe Pain (7 - 10)  magnesium hydroxide Suspension 30 milliLiter(s) Oral every 12 hours PRN Constipation  ondansetron Injectable 4 milliGRAM(s) IV Push every 6 hours PRN Nausea and/or Vomiting  ondansetron Injectable 4 milliGRAM(s) IV Push every 6 hours PRN Nausea      Allergies    eggs (Nausea)  No Known Drug Allergies    Intolerances          Vital Signs Last 24 Hrs  T(C): 36.9 (08 Apr 2021 07:46), Max: 37.2 (07 Apr 2021 23:12)  T(F): 98.4 (08 Apr 2021 07:46), Max: 99 (07 Apr 2021 23:12)  HR: 72 (08 Apr 2021 07:46) (72 - 91)  BP: 94/52 (08 Apr 2021 07:46) (94/52 - 138/84)  BP(mean): --  RR: 18 (08 Apr 2021 07:46) (16 - 18)  SpO2: 97% (08 Apr 2021 07:46) (93% - 99%)    PHYSICAL EXAM:  GENERAL: NAD, well-groomed, well-developed  HEAD:  Atraumatic, Normocephalic  EYES: EOMI, PERRLA, conjunctiva and sclera clear  ENMT: Moist mucous membranes, No lesions; No tonsillar erythema, exudates, or enlargement  NECK: Supple, No JVD, Normal thyroid  NERVOUS SYSTEM:  Alert & Oriented X3, Good concentration; All 4 extremities mobile, no gross sensory deficits.   CHEST/LUNG: Clear to auscultation bilaterally; No rales, rhonchi, wheezing, or rubs  HEART: Regular rate and rhythm; No murmurs, rubs, or gallops  ABDOMEN: Soft, Nontender, Nondistended; Bowel sounds present  EXTREMITIES:  2+ Peripheral Pulses, No clubbing, cyanosis, or edema  LYMPH: No lymphadenopathy noted  SKIN: No rashes or lesions    LABS:                        11.4   12.12 )-----------( 266      ( 08 Apr 2021 07:10 )             35.7     08 Apr 2021 07:10    138    |  103    |  24     ----------------------------<  164    4.3     |  27     |  0.98     Ca    9.2        08 Apr 2021 07:10          CAPILLARY BLOOD GLUCOSE      POCT Blood Glucose.: 149 mg/dL (08 Apr 2021 07:41)  POCT Blood Glucose.: 133 mg/dL (07 Apr 2021 21:36)  POCT Blood Glucose.: 173 mg/dL (07 Apr 2021 16:51)  POCT Blood Glucose.: 221 mg/dL (07 Apr 2021 12:04)      RADIOLOGY & ADDITIONAL TESTS:    Imaging Personally Reviewed:  [ ] YES     Consultant(s) Notes Reviewed:      Care Discussed with Consultants/Other Providers:    Advanced Directives: [ ] DNR  [ ] No feeding tube  [ ] MOLST in chart  [ ] MOLST completed today  [ ] Unknown  
Ortho PA- POD#1 - s/p  TLIF L4-5    Patient alert and oriented.  No c/o new paresthesias or weakness in extremities.  Denies back pain or nausea.    Vital Signs Last 24 Hrs  T(C): 36.8 (07 Apr 2021 07:21), Max: 37.3 (06 Apr 2021 18:30)  T(F): 98.3 (07 Apr 2021 07:21), Max: 99.1 (06 Apr 2021 18:30)  HR: 84 (07 Apr 2021 07:21) (62 - 98)  BP: 115/72 (07 Apr 2021 07:21) (115/72 - 143/73)  BP(mean): --  RR: 18 (07 Apr 2021 07:21) (13 - 18)  SpO2: 100% (07 Apr 2021 07:21) (97% - 100%)  I&O's Detail    06 Apr 2021 07:01  -  07 Apr 2021 07:00  --------------------------------------------------------  IN:    Lactated Ringers: 250 mL    Lactated Ringers: 800 mL    Oral Fluid: 50 mL  Total IN: 1100 mL    OUT:    Accordian drain lower spine (mL): 20 mL over 12 hours    Estimated Blood Loss (mL): 200 mL in OR    Indwelling Catheter - Urethral (mL): 1400 mL (removed this AM)  Total OUT: 1620 mL    Total NET: -520 mL      07 Apr 2021 07:01  -  07 Apr 2021 11:18  --------------------------------------------------------  IN:  Total IN: 0 mL    OUT:    Indwelling Catheter - Urethral (mL): 300 mL  Total OUT: 300 mL    Total NET: -300 mL          07 Apr 2021 07:08    137    |  101    |  15     ----------------------------<  175<H>  4.3     |  27     |  0.86     Ca    9.6        07 Apr 2021 07:08                            12.6   13.11<H> )-----------( 309      ( 07 Apr 2021 07:08 )             38.2   07 Apr 2021 07:08      MEDICATIONS:acetaminophen   Tablet .. 1000 milliGRAM(s) Oral every 8 hours  dextrose 40% Gel 15 Gram(s) Oral once  dextrose 5%. 1000 milliLiter(s) IV Continuous <Continuous>  dextrose 5%. 1000 milliLiter(s) IV Continuous <Continuous>  dextrose 50% Injectable 25 Gram(s) IV Push once  dextrose 50% Injectable 12.5 Gram(s) IV Push once  dextrose 50% Injectable 25 Gram(s) IV Push once  diazepam    Tablet 5 milliGRAM(s) Oral every 8 hours PRN  diphenhydrAMINE 25 milliGRAM(s) Oral every 8 hours PRN  glucagon  Injectable 1 milliGRAM(s) IntraMuscular once  HYDROmorphone   Tablet 2 milliGRAM(s) Oral every 3 hours PRN  HYDROmorphone   Tablet 4 milliGRAM(s) Oral every 3 hours PRN  HYDROmorphone  Injectable 0.5 milliGRAM(s) IV Push every 3 hours PRN  insulin lispro (ADMELOG) corrective regimen sliding scale   SubCutaneous Before meals and at bedtime  magnesium hydroxide Suspension 30 milliLiter(s) Oral every 12 hours PRN  ondansetron Injectable 4 milliGRAM(s) IV Push every 6 hours PRN  ondansetron Injectable 4 milliGRAM(s) IV Push every 6 hours PRN  senna 2 Tablet(s) Oral at bedtime  simvastatin 20 milliGRAM(s) Oral at bedtime    Anticoagulation: PAS to LE's      Antibiotics: complete      Pain medications:   acetaminophen   Tablet .. 1000 milliGRAM(s) Oral every 8 hours  diazepam    Tablet 5 milliGRAM(s) Oral every 8 hours PRN  diphenhydrAMINE 25 milliGRAM(s) Oral every 8 hours PRN  HYDROmorphone   Tablet 2 milliGRAM(s) Oral every 3 hours PRN  HYDROmorphone   Tablet 4 milliGRAM(s) Oral every 3 hours PRN  HYDROmorphone  Injectable 0.5 milliGRAM(s) IV Push every 3 hours PRN  ondansetron Injectable 4 milliGRAM(s) IV Push every 6 hours PRN  ondansetron Injectable 4 milliGRAM(s) IV Push every 6 hours PRN          Physical Exam:  Lower back-Primary surgical bandage and hemovac drain removed and sterile occlusive bandage placed over dry and intact drain site and sutured surgical wound.  Motor and sensory grossly intact throughout.  PAS on LE's. Calves soft and nontender.    A/P: Orthopedically stable  -Continue LSO/Quik-Draw corset when OOB  -Advance ambulation and stairs with PT/OT  -Pain management with prn oral narcotics.  -Discharge plans for home tomorrow.        
Patient is a 55y old  Female who presents with a chief complaint of lumbar surgery (06 Apr 2021 21:01)      INTERVAL HPI/OVERNIGHT EVENTS:  Patient seen awake and sitting in room, she reports some back pain, but had brace put on today and was able to ambulate with PT in the [place]. Reports sleeping well overnight, denies chest pain or SOB. No overnight events as per nurse, no tele events as per tele nurse.       MEDICATIONS  (STANDING):  acetaminophen   Tablet .. 1000 milliGRAM(s) Oral every 8 hours  dextrose 40% Gel 15 Gram(s) Oral once  dextrose 5%. 1000 milliLiter(s) (50 mL/Hr) IV Continuous <Continuous>  dextrose 5%. 1000 milliLiter(s) (100 mL/Hr) IV Continuous <Continuous>  dextrose 50% Injectable 25 Gram(s) IV Push once  dextrose 50% Injectable 12.5 Gram(s) IV Push once  dextrose 50% Injectable 25 Gram(s) IV Push once  glucagon  Injectable 1 milliGRAM(s) IntraMuscular once  insulin lispro (ADMELOG) corrective regimen sliding scale   SubCutaneous Before meals and at bedtime  senna 2 Tablet(s) Oral at bedtime  simvastatin 20 milliGRAM(s) Oral at bedtime    MEDICATIONS  (PRN):  diazepam    Tablet 5 milliGRAM(s) Oral every 8 hours PRN muscle spasms  diphenhydrAMINE 25 milliGRAM(s) Oral every 8 hours PRN Pruritus  HYDROmorphone   Tablet 2 milliGRAM(s) Oral every 3 hours PRN Moderate Pain (4 - 6)  HYDROmorphone   Tablet 4 milliGRAM(s) Oral every 3 hours PRN Severe Pain (7 - 10)  HYDROmorphone  Injectable 0.5 milliGRAM(s) IV Push every 3 hours PRN Severe Pain (7 - 10)  magnesium hydroxide Suspension 30 milliLiter(s) Oral every 12 hours PRN Constipation  ondansetron Injectable 4 milliGRAM(s) IV Push every 6 hours PRN Nausea and/or Vomiting  ondansetron Injectable 4 milliGRAM(s) IV Push every 6 hours PRN Nausea      Allergies    eggs (Nausea)  No Known Drug Allergies    Intolerances        REVIEW OF SYSTEMS:  CONSTITUTIONAL: No fever, weight loss, or fatigue  EYES: No eye pain, visual disturbances, or discharge  ENMT:  No difficulty hearing, tinnitus, vertigo; No sinus or throat pain  NECK: No pain or stiffness  RESPIRATORY: No cough, wheezing, chills or hemoptysis; No shortness of breath  CARDIOVASCULAR: No chest pain, palpitations, lightheadedness, or leg swelling  GASTROINTESTINAL: No abdominal or epigastric pain. No nausea, vomiting, or hematemesis; No diarrhea or constipation. No melena or hematochezia.  GENITOURINARY: No dysuria, frequency, hematuria, or incontinence  NEUROLOGICAL: No headaches, memory loss, vertigo, loss of strength, numbness, or tremors  SKIN: No itching, burning, rashes, or lesions   LYMPH NODES: No enlarged glands  ENDOCRINE: No heat or cold intolerance; No hair loss; No polydipsia or polyuria  MUSCULOSKELETAL: Back pain, no swelling; No muscle or extremity pain    PHYSICAL EXAM:  GENERAL: NAD, well-groomed, well-developed, no acute distress  HEAD:  Atraumatic, Normocephalic  EYES: EOMI, PERRLA, conjunctiva and sclera clear  ENMT: Moist mucous membranes, Good dentition  NERVOUS SYSTEM:  Alert & Oriented X3, Good concentration; All 4 extremities mobile, no gross sensory deficits.   CHEST/LUNG: Clear to auscultation bilaterally; No rales, rhonchi, wheezing, or rubs appreciated. Drain present with serosanguinous contents in bag.  HEART: Regular rate and rhythm; No murmurs, rubs, or gallops  ABDOMEN: Soft, Nontender, Nondistended; Bowel sounds present  EXTREMITIES:  2+ Peripheral Pulses, No clubbing, cyanosis, or edema  SKIN: No rashes or lesions    Vital Signs Last 24 Hrs  T(C): 36.8 (07 Apr 2021 07:21), Max: 37.3 (06 Apr 2021 18:30)  T(F): 98.3 (07 Apr 2021 07:21), Max: 99.1 (06 Apr 2021 18:30)  HR: 84 (07 Apr 2021 07:21) (62 - 98)  BP: 115/72 (07 Apr 2021 07:21) (115/72 - 143/73)  BP(mean): --  RR: 18 (07 Apr 2021 07:21) (13 - 18)  SpO2: 100% (07 Apr 2021 07:21) (97% - 100%)    LABS:                        12.6   13.11 )-----------( 309      ( 07 Apr 2021 07:08 )             38.2     07 Apr 2021 07:08    137    |  101    |  15     ----------------------------<  175    4.3     |  27     |  0.86     Ca    9.6        07 Apr 2021 07:08          CAPILLARY BLOOD GLUCOSE      POCT Blood Glucose.: 187 mg/dL (07 Apr 2021 07:55)  POCT Blood Glucose.: 151 mg/dL (06 Apr 2021 22:45)      Care Discussed with Consultants/Other Providers: ortho PA  
Progress Note    Post Op Day # 2  55y Female  s/p TLIF L4-5       SUBJECTIVE    Patient seen and examined at bedside.   Pain  well controlled on current pain regimen  Denies CP, SOB, N/V/D, weakness, numbness   No new complaints     OBJECTIVE     Vital Signs Last 24 Hrs  T(C): 36.9 (08 Apr 2021 07:46), Max: 37.2 (07 Apr 2021 23:12)  T(F): 98.4 (08 Apr 2021 07:46), Max: 99 (07 Apr 2021 23:12)  HR: 72 (08 Apr 2021 07:46) (72 - 91)  BP: 94/52 (08 Apr 2021 07:46) (94/52 - 138/84)  BP(mean): --  RR: 18 (08 Apr 2021 07:46) (16 - 18)  SpO2: 97% (08 Apr 2021 07:46) (93% - 99%)  I&O's Summary    07 Apr 2021 07:01  -  08 Apr 2021 07:00  --------------------------------------------------------  IN: 0 mL / OUT: 300 mL / NET: -300 mL        PHYSICAL EXAM    Surgical incision C/D/I  Distal pulses (+2) bilaterally  Calves supple/nontender bilaterally  Sensation grossly intact to light touch bilaterally  EHL/FHL intact bilaterally    LABS                                 11.4<L>  12.12<H> )-----------( 266      ( 08 Apr 2021 07:10 )             35.7   08 Apr 2021 07:10                        12.6   13.11<H> )-----------( 309      ( 07 Apr 2021 07:08 )             38.2   07 Apr 2021 07:08    08 Apr 2021 07:10    138    |  103    |  24<H>  ----------------------------<  164<H>  4.3     |  27     |  0.98   07 Apr 2021 07:08    137    |  101    |  15     ----------------------------<  175<H>  4.3     |  27     |  0.86     Ca    9.2        08 Apr 2021 07:10  Ca    9.6        07 Apr 2021 07:08        ASSESSMENT AND PLAN:     -  Pain management as clinically indicated  -  DVT prophylaxis: SCDs       -  PT/OT: Weight bearing as tolerated, spine precautions   -  Incentive spirometry  - Disposition: Home when cleared by medicine, PT, and OT

## 2021-04-10 ENCOUNTER — TRANSCRIPTION ENCOUNTER (OUTPATIENT)
Age: 56
End: 2021-04-10

## 2021-04-19 ENCOUNTER — APPOINTMENT (OUTPATIENT)
Dept: ORTHOPEDIC SURGERY | Facility: CLINIC | Age: 56
End: 2021-04-19
Payer: COMMERCIAL

## 2021-04-19 VITALS
SYSTOLIC BLOOD PRESSURE: 129 MMHG | HEART RATE: 93 BPM | WEIGHT: 197 LBS | DIASTOLIC BLOOD PRESSURE: 78 MMHG | TEMPERATURE: 97.3 F | HEIGHT: 63 IN | BODY MASS INDEX: 34.91 KG/M2

## 2021-04-19 DIAGNOSIS — M54.16 RADICULOPATHY, LUMBAR REGION: ICD-10-CM

## 2021-04-19 DIAGNOSIS — M48.062 SPINAL STENOSIS, LUMBAR REGION WITH NEUROGENIC CLAUDICATION: ICD-10-CM

## 2021-04-19 PROCEDURE — 72100 X-RAY EXAM L-S SPINE 2/3 VWS: CPT

## 2021-04-19 PROCEDURE — 99024 POSTOP FOLLOW-UP VISIT: CPT

## 2021-04-19 RX ORDER — TIZANIDINE 2 MG/1
2 TABLET ORAL EVERY 8 HOURS
Qty: 30 | Refills: 2 | Status: ACTIVE | COMMUNITY
Start: 2021-04-19 | End: 1900-01-01

## 2021-04-19 RX ORDER — CELECOXIB 100 MG/1
100 CAPSULE ORAL TWICE DAILY
Qty: 30 | Refills: 0 | Status: ACTIVE | COMMUNITY
Start: 2021-04-19 | End: 1900-01-01

## 2021-04-19 NOTE — HISTORY OF PRESENT ILLNESS
[___ Weeks Post Op] : [unfilled] weeks post op [9] : the patient reports pain that is 9/10 in severity [Constipation] : constipation [Clean/Dry/Intact] : clean, dry and intact [Doing Well] : is doing well [No Sign of Infection] : is showing no signs of infection [Adequate Pain Control] : has adequate pain control [Sutures Removed] : sutures were removed [Limited ADLs] : to participate in activities of daily living with limitations [No Work] : not to work [No Housework] : not to do housework [No Sports] : not to participate in sports [Chills] : no chills [Diarrhea] : no diarrhea [Dysuria] : no dysuria [Fever] : no fever [Nausea] : no nausea [Vomiting] : no vomiting [Erythema] : not erythematous [Discharge] : absent of discharge [Swelling] : not swollen [Dehiscence] : not dehisced [Vascular Intact] : ~T peripheral vascular exam normal [Negative Silvestre's] : maneuvers demonstrated a negative Silvestre's sign [de-identified] : posterior lumbar fusion 4/6/21 L4-5 [de-identified] : Patient is here today for 1st post operative visit after posterior lumbar fusion 4/6/21. She is wearing a brace and ambulating with a walker.  She reports over the past 3 days increasing pain on the right side with some radiation to her right buttock.  She is currently taking Tylenol for her symptoms. [de-identified] : Patient is ambulating with a slightly stooped forward posture with a non-ataxic nonantalgic gait.  5 out of 5 motor strength in hip flexion knee extension flexion ankle dorsiflexion plantarflexion EHL bilaterally.  Grossly intact light touch sensation bilateral lower extremities.  Unable to elicit knee jerk and ankle jerk reflexes bilaterally symmetrically.  No groin pain with hip internal or external rotation.  Negative straight leg raise bilaterally. [de-identified] : I spoke at length with the patient about her current presentation.  She reported up to 3 days ago she was doing better but she felt increasing symptoms right-sided lower back and right buttock which is noted when she is trying to get out of bed or twisting her back.  When comparing x-rays obtained postoperatively to x-rays obtained now there is suggestion of some loss of reduction and slight posterior migration of the implant at the L4-5 level.  At this time I recommended use of the lumbar corset 24 hours a day till she is seen again in the office in 2 weeks.  Her sutures were removed today and Steri-Strips applied along with dry sterile dressing.  She may leave the dressing on until she is seen again for follow-up or may take it off in 2 to 3 days.  She does not need any additional dressing at this time besides the Steri-Strips applied today.\par \par Patient understands that if there is progressive migration of the implant posteriorly she may need revision surgery to address the implant migration.  If the implant stabilizes and there is no further change in appearance with x-rays we can continue to treat her conservatively.  For now there is no obvious nerve tension signs and no radicular complaints reported by the patient.  Right-sided low back pain appears muscular in origin could be related to postoperative changes and the changes noted on the x-rays today.  Prescribed her Celebrex and gabapentin as well as tizanidine to use symptomatic\par \par The patient was educated regarding their condition, treatment options as well as prescribed course of treatment. \par Risks and benefits as well as alternatives to the proposed treatment were also provided to the patient \par They were given the opportunity to have all their questions answered to their satisfaction.\par \par Vital signs were reviewed with the patient and the patient was instructed to followup with their primary care provider for further management.\par \par Healthy lifestyle recommendations were also made including a tobacco free lifestyle, proper diet, and weight control. [de-identified] : AP and lateral views of lumbar spine obtained today were compared with x-rays performed in the hospital.  Pedicle screw and tano construct is seen at L4-5.  Grade 1-2 spondylolisthesis seen at L4-5 with interbody cage.  Decompression is noted bilaterally at 5.  Suggestion of slight loss of reduction and slight posterior migration of the interbody cages noted when compared with x-rays performed postoperatively in the hospital.

## 2021-04-21 RX ORDER — METHYLPREDNISOLONE 4 MG/1
4 TABLET ORAL
Qty: 1 | Refills: 0 | Status: ACTIVE | COMMUNITY
Start: 2021-04-21 | End: 1900-01-01

## 2021-04-23 NOTE — ED ADULT NURSE NOTE - BREATHING, MLM
Caller: Kristian Ariasry    Relationship: Self    Best call back number: [phone]    What orders are you requesting (i.e. lab or imaging):     In what timeframe would the patient need to come in:     Where will you receive your lab/imaging services:    Additional notes: PATIENT HAS AN APPOINTMENT SCHEDULED ON 10-25-21 AND WOULD LIKE TO KNOW IF HE NEEDS TO BE FASTING FOR LABS     PLEASE CALL          Spontaneous, unlabored and symmetrical

## 2021-04-24 ENCOUNTER — APPOINTMENT (OUTPATIENT)
Dept: CT IMAGING | Facility: IMAGING CENTER | Age: 56
End: 2021-04-24
Payer: COMMERCIAL

## 2021-04-24 ENCOUNTER — RESULT REVIEW (OUTPATIENT)
Age: 56
End: 2021-04-24

## 2021-04-24 ENCOUNTER — OUTPATIENT (OUTPATIENT)
Dept: OUTPATIENT SERVICES | Facility: HOSPITAL | Age: 56
LOS: 1 days | End: 2021-04-24
Payer: COMMERCIAL

## 2021-04-24 DIAGNOSIS — Z98.1 ARTHRODESIS STATUS: Chronic | ICD-10-CM

## 2021-04-24 DIAGNOSIS — Z98.1 ARTHRODESIS STATUS: ICD-10-CM

## 2021-04-24 DIAGNOSIS — Z00.8 ENCOUNTER FOR OTHER GENERAL EXAMINATION: ICD-10-CM

## 2021-04-24 PROCEDURE — 76376 3D RENDER W/INTRP POSTPROCES: CPT

## 2021-04-24 PROCEDURE — 76376 3D RENDER W/INTRP POSTPROCES: CPT | Mod: 26

## 2021-04-24 PROCEDURE — 72131 CT LUMBAR SPINE W/O DYE: CPT | Mod: 26

## 2021-04-24 PROCEDURE — 72131 CT LUMBAR SPINE W/O DYE: CPT

## 2021-04-26 ENCOUNTER — APPOINTMENT (OUTPATIENT)
Dept: ORTHOPEDIC SURGERY | Facility: CLINIC | Age: 56
End: 2021-04-26
Payer: COMMERCIAL

## 2021-04-26 VITALS
SYSTOLIC BLOOD PRESSURE: 150 MMHG | HEIGHT: 63 IN | HEART RATE: 92 BPM | DIASTOLIC BLOOD PRESSURE: 94 MMHG | TEMPERATURE: 97.7 F

## 2021-04-26 DIAGNOSIS — T84.498A OTHER MECHANICAL COMPLICATION OF OTHER INTERNAL ORTHOPEDIC DEVICES, IMPLANTS AND GRAFTS, INITIAL ENCOUNTER: ICD-10-CM

## 2021-04-26 PROCEDURE — 99024 POSTOP FOLLOW-UP VISIT: CPT

## 2021-04-26 RX ORDER — HYDROMORPHONE HYDROCHLORIDE 2 MG/1
2 TABLET ORAL
Qty: 30 | Refills: 0 | Status: ACTIVE | COMMUNITY
Start: 2021-04-23 | End: 1900-01-01

## 2021-04-26 NOTE — HISTORY OF PRESENT ILLNESS
[___ Weeks Post Op] : [unfilled] weeks post op [Slow Progress] : is progressing slowly [No Sign of Infection] : is showing no signs of infection [Adequate Pain Control] : has adequate pain control [de-identified] : Posterior lumbar fusion 4/6/21 [de-identified] : Patient is here today to review cat scan of lumbar spine 4/24/21. Overall patient states pain is severe in her right sided low back.  She has used Dilaudid and Tylenol for this.  She did not takes Celebrex previously.  She completed the Medrol Dosepak today.  She has been taking gabapentin infrequently. [de-identified] : seen with her sister\par Seen with an LSOm walker\par Patient is ambulating with a slightly stooped forward posture with a non-ataxic nonantalgic gait. 5 out of 5 motor strength in hip flexion knee extension flexion ankle dorsiflexion plantarflexion EHL bilaterally. Grossly intact light touch sensation bilateral lower extremities. Unable to elicit knee jerk and ankle jerk reflexes bilaterally symmetrically. No groin pain with hip internal or external rotation. Negative straight leg raise bilaterally. The surgical incision site(s) was clean, dry and intact, not erythematous, absent of discharge, not swollen and not dehisced. Additional findings included peripheral vascular exam normal and maneuvers demonstrated a negative Silvestre's sign.  [de-identified] : EXAM: CT LUMBAR SPINE\par \par EXAM: CT 3D RECONSTRUCT WALLY WILSON\par \par PROCEDURE DATE: 04/24/2021\par \par INTERPRETATION: LUMBOSACRAL SPINE CT\par \par CLINICAL INFORMATION: New onset right-sided low back pain status post lumbar fusion on 6 April 2021. Evaluate for hardware complication.\par TECHNIQUE: Axial CT images were obtained of the lumbosacral spine using metal artifact reduction with coronal and sagittal reconstructions. Three dimensional reconstructions were obtained on an independent workstation. The interpretation of these images is included in the body of the report of the main portion of the study.\par \par COMPARISON: Fluoroscopic spot images of the lumbar spine 6 April 2021.\par \par FINDINGS:\par \par SPINAL SEGMENTATION: The study assumes 5 non-rib bearing lumbar type vertebral bodies.\par \par ALIGNMENT: 10 mm anterolisthesis of L4 on L5. The anterolisthesis is new when compared to prior immediate postoperative imaging.\par \par VERTEBRA: Vertebral body heights are preserved. Patient status post right sided L4-5 facetectomy with discectomy. There is instrumented posterior fusion of the L4 and L5 vertebral bodies using tano and pedicle screw fixation. Immature bone graft material seen surrounding the posterior fusion hardware. An intervertebral disc spacer is identified along the rightward aspect of the L4-5 intervertebral disc space.\par An acute fracture is identified along the lateral aspect of the right L4 vertebral body at the junction between the vertebral body and pedicle/transverse process (604:25-28, 6:250-268). The fracture has led to the superomedial migration of the right L4 pedicle screw from its original position. The remaining hardware is intact without evidence of acute complication. No additional fractures are identified.\par \par DISC SPACES: As mentioned above, the patient is status post discectomy and placement of intervertebral disc spacer at the L4-5 level. Remaining intervertebral disc heights are maintained.\par \par INTRAABDOMINAL AND INTRAPELVIC SOFT TISSUES: Postsurgical changes are seen along the posterior soft tissues with ill-defined fluid density in the subcutaneous fat which may represent a small postsurgical seroma. Vascular calcifications. Lung bases are clear.\par \par \par LEVELS:\par \par T12/L1: No central or neural foraminal stenosis.\par \par L1/L2: No central or neural foraminal stenosis.\par \par L2/L3: No central or neural foraminal stenosis.\par \par L3/L4: No central or neural foraminal stenosis.\par \par L4/L5: Patient status post discectomy and placement of intervertebral disc spacer. Evaluation of the neural foramina is limited by hardware streak artifact.\par \par L5/S1: Small disc bulge causes mild bilateral neural foraminal stenosis.\par \par IMPRESSION:\par 1. Patient status post right L4-5 facetectomy with discectomy, placement of intervertebral cage, and instrumented posterior fusion.\par 2. Acute fracture along the right lateral aspect of the L4 vertebral body with associated superior and lateral migration of the right L4 pedicle screw.\par 3. Grade 1 anterolisthesis of L4 and L5, new when compared to intraoperative imaging.\par \par These results were discussed by Dr. Mauricio with Dr. Crenshaw on 26 April 2021 at 1000 with repeat back.\par \par ALCON MAURICIO MD; Attending Radiologist\par This document has been electronically signed. Apr 26 2021 10:15AM [de-identified] : I reviewed the CT scan findings with the patient and her sister today.  Indicated to her that there is slight failure of the right L4 screw with some loss of reduction and migration of the implant when compared with intraoperative images.  There is slight posterior migration of the intervertebral cage.  However I compared the x-rays at her last visit with a CT scan obtained recently 2 days ago there is no significant interval change suggesting possible stabilization of the implant at this level.  I advised her on continued conservative treatment with bracing 24 hours a day as well as taking Celebrex tizanidine and gabapentin as previously prescribed.  She may use Dilaudid for breakthrough pain.  Recommended follow-up in 10 days for repeat x-rays AP lateral lumbar spine.  She understands at this time given the slight failure and fracture of the right side of her L4 vertebral body she may anticipate some pain before improvement of symptoms.  There is no neural impingement noted at this time and on exam no specific nerve tension signs or focal neurologic deficits noted.  Her symptoms are likely related to the acute fracture and slight failure L4 screw and this may recover over time.  The alternatives would be additional surgery though at this time recommendation is to continue observation and continue conservative treatment with immobilization with the brace.\par \par All her questions and her sister's questions were answered at length today.

## 2021-05-03 ENCOUNTER — APPOINTMENT (OUTPATIENT)
Dept: ORTHOPEDIC SURGERY | Facility: CLINIC | Age: 56
End: 2021-05-03

## 2021-05-05 ENCOUNTER — APPOINTMENT (OUTPATIENT)
Dept: ORTHOPEDIC SURGERY | Facility: CLINIC | Age: 56
End: 2021-05-05
Payer: COMMERCIAL

## 2021-05-05 VITALS — TEMPERATURE: 97.6 F | SYSTOLIC BLOOD PRESSURE: 159 MMHG | HEART RATE: 88 BPM | DIASTOLIC BLOOD PRESSURE: 78 MMHG

## 2021-05-05 DIAGNOSIS — S32.048S: ICD-10-CM

## 2021-05-05 PROCEDURE — 99072 ADDL SUPL MATRL&STAF TM PHE: CPT

## 2021-05-05 PROCEDURE — 72100 X-RAY EXAM L-S SPINE 2/3 VWS: CPT

## 2021-05-05 PROCEDURE — 99213 OFFICE O/P EST LOW 20 MIN: CPT | Mod: 24

## 2021-05-05 RX ORDER — CIPROFLOXACIN HYDROCHLORIDE 250 MG/1
250 TABLET, FILM COATED ORAL
Qty: 6 | Refills: 0 | Status: ACTIVE | COMMUNITY
Start: 2021-03-23

## 2021-05-05 RX ORDER — ACETAMINOPHEN EXTRA STRENGTH 500 MG/1
500 TABLET ORAL
Qty: 30 | Refills: 0 | Status: ACTIVE | COMMUNITY
Start: 2021-03-04

## 2021-05-05 RX ORDER — DOXYCYCLINE HYCLATE 100 MG/1
100 TABLET ORAL
Qty: 20 | Refills: 0 | Status: ACTIVE | COMMUNITY
Start: 2021-03-12

## 2021-05-05 RX ORDER — AMOXICILLIN AND CLAVULANATE POTASSIUM 875; 125 MG/1; MG/1
875-125 TABLET, COATED ORAL
Qty: 20 | Refills: 0 | Status: ACTIVE | COMMUNITY
Start: 2021-02-19

## 2021-05-05 RX ORDER — BLOOD SUGAR DIAGNOSTIC
STRIP MISCELLANEOUS
Qty: 200 | Refills: 0 | Status: ACTIVE | COMMUNITY
Start: 2021-03-23

## 2021-05-05 RX ORDER — AZELASTINE HYDROCHLORIDE 137 UG/1
0.1 SPRAY, METERED NASAL
Qty: 30 | Refills: 0 | Status: ACTIVE | COMMUNITY
Start: 2021-02-19

## 2021-05-05 RX ORDER — LISINOPRIL 10 MG/1
10 TABLET ORAL
Qty: 30 | Refills: 0 | Status: ACTIVE | COMMUNITY
Start: 2021-03-04

## 2021-05-05 RX ORDER — GLIPIZIDE 2.5 MG/1
2.5 TABLET, FILM COATED, EXTENDED RELEASE ORAL
Qty: 90 | Refills: 0 | Status: ACTIVE | COMMUNITY
Start: 2021-03-23

## 2021-05-05 NOTE — HISTORY OF PRESENT ILLNESS
[___ Weeks Post Op] : [unfilled] weeks post op [8] : the patient reports pain that is 8/10 in severity [Chills] : no chills [Constipation] : no constipation [Diarrhea] : no diarrhea [Dysuria] : no dysuria [Fever] : no fever [Nausea] : no nausea [Vomiting] : no vomiting [Healed] : healed [Erythema] : not erythematous [Discharge] : absent of discharge [Swelling] : not swollen [Dehiscence] : not dehisced [Vascular Intact] : ~T peripheral vascular exam normal [Negative Silvestre's] : maneuvers demonstrated a negative Silvestre's sign [Slow Progress] : is progressing slowly [No Sign of Infection] : is showing no signs of infection [Adequate Pain Control] : has adequate pain control [Limited ADLs] : to participate in activities of daily living with limitations [No Work] : not to work [No Housework] : not to do housework [No Sports] : not to participate in sports [de-identified] : Posterior lumbar fusion 4/6/21 [de-identified] : patient is here today for 4 week postoperative visit. She states that she is currently taking the medication prescribed and has been wearing her brace. She states that overall she is not feeling great due to her pain.  [de-identified] : Seen with a friend.  Stooped forward posture.  Seen with a rolling walker.  Is able to transfer with some difficulty but is ambulating without significant pain on observation.  5 out of 5 motor strength hip flexion knee extension flexion ankle dorsiflexion plantarflexion EHL bilaterally.  Grossly intact light touch sensation bilateral lower extremities.  Negative straight leg raise bilateral lower extremities.  Unable to elicit knee jerk or ankle jerk reflexes symmetrically. [de-identified] : AP and lateral of his lumbar spine obtained today were compared with x-rays obtained on April 19, 2021.  Pedicle screw and tano construct is noted at the L4-5 level.  There is suggestion appropriate progressive retropulsion of the L4-5 cage with anterolisthesis of L4 on L5.  There is also suggestion of pullout of the right L4 pedicle screw with the remaining screws appearing intact at the L5 on the right and L4-5 on the left.  Loss of sagittal balance is noted. [de-identified] : At this point the patient appears to have failed orthopedic implant with posterior migration of the interbody cage at the L4-5 level as well as suggestion of increasing pullout of the right L4 pedicle screw.  She was noted to have a fracture of the right side of the vertebral body contributing to the pullout on the L4 screw.  Bracing and conservative treatment have been somewhat ineffective and the patient continues to have transfer pain.  At this point given the progressive posterior migration of the interbody cage as well as pullout of the right L4 pedicle screw I advised the patient on revision surgery.  Recommended revision extension of the instrumentation to the L3-4 level.  Recommended exchange of the interbody cage as well.  She understands that there may be an attempt at reduction though if it is unsuccessful we will fuse in situ and leave the spondylolisthesis as is.  She understands that the fracture pain may continue for some time to 3 months and the goal of surgery would be to stabilize the spine and to adjust the posteriorly migrated interbody cage as well as the pullout of the right L5 screw.\par \par The patient was advised that based upon their clinical presentation and radiographic findings they meet inclusion criteria for spinal surgery to address their spinal pathology.\par The spectrum of treatments for their spinal condition were reviewed in detail. The goals, alternatives, risks and benefits of spinal surgery were reviewed in detail with the patient.  \par Benefits and risks of operative and nonoperative treatment for the patient's pathology were outlined at length with the patient.  Specifically, those risks are understood to be, but not limited to, bleeding, infection, fracture (both during surgery and after surgery), adjacent level disease, failure to heal (significantly increased by smoking), need for further surgery, failure of instrumentation (if used), recurrence of problem and symptoms, neurovascular injury, dural tears or leaks resulting in spinal fluid leakage and requiring additional invasive and non-invasive treatments, need for additional procedures, possible paralysis resulting in loss of use of arms, legs, bowel and bladder function as well as sexual dysfunction, and anesthetic risks including death. \par Available alternatives to surgery were also discussed with the patient. In addition, the patient further understands that there may be indicated need for somatosensory evoked potential monitoring, real-time EMG monitoring, and motor evoked potential monitoring during the procedure along with placement of needle electrodes. A neuromonitoring service will discuss the risks and benefits separately with the patient.\par The patient also understands that having a surgical procedure and being hospitalized carries risks in addition to the ones described above.\par \par The patient was advised that Dr. Tracy operates as a surgical team with his associate Dr. Red and/or with surgical Physician Assistants (PA)\par \par The patient was advised that they will require a medical preoperative risk evaluation by their PCP. Further medical subspecialty clearances such as cardiac may be indicated if felt needed by their PCP.\par \par The patient was advised he/she may call our office after careful consideration of their treatment options and further consultation with any other physician to begin the process of preoperative planning for elective spinal surgery at a time of their choosing. \par The patient verbalized an understanding of the procedure, its indications, and its common potential complications and attendant risks, and is willing to proceed. No guarantees were made with regard to a complete recovery. We will proceed in a timely manner after obtaining medical clearance.

## 2021-05-06 ENCOUNTER — OUTPATIENT (OUTPATIENT)
Dept: OUTPATIENT SERVICES | Facility: HOSPITAL | Age: 56
LOS: 1 days | End: 2021-05-06
Payer: COMMERCIAL

## 2021-05-06 VITALS
DIASTOLIC BLOOD PRESSURE: 90 MMHG | OXYGEN SATURATION: 99 % | HEIGHT: 63 IN | WEIGHT: 186.07 LBS | SYSTOLIC BLOOD PRESSURE: 148 MMHG | TEMPERATURE: 98 F | RESPIRATION RATE: 16 BRPM | HEART RATE: 80 BPM

## 2021-05-06 DIAGNOSIS — T84.498S OTHER MECHANICAL COMPLICATION OF OTHER INTERNAL ORTHOPEDIC DEVICES, IMPLANTS AND GRAFTS, SEQUELA: ICD-10-CM

## 2021-05-06 DIAGNOSIS — Z98.1 ARTHRODESIS STATUS: Chronic | ICD-10-CM

## 2021-05-06 DIAGNOSIS — S32.009A UNSPECIFIED FRACTURE OF UNSPECIFIED LUMBAR VERTEBRA, INITIAL ENCOUNTER FOR CLOSED FRACTURE: ICD-10-CM

## 2021-05-06 DIAGNOSIS — Z98.1 ARTHRODESIS STATUS: ICD-10-CM

## 2021-05-06 DIAGNOSIS — T84.498A OTHER MECHANICAL COMPLICATION OF OTHER INTERNAL ORTHOPEDIC DEVICES, IMPLANTS AND GRAFTS, INITIAL ENCOUNTER: ICD-10-CM

## 2021-05-06 DIAGNOSIS — S32.048S: ICD-10-CM

## 2021-05-06 DIAGNOSIS — Z01.818 ENCOUNTER FOR OTHER PREPROCEDURAL EXAMINATION: ICD-10-CM

## 2021-05-06 LAB
ALBUMIN SERPL ELPH-MCNC: 3.9 G/DL — SIGNIFICANT CHANGE UP (ref 3.3–5)
ALP SERPL-CCNC: 99 U/L — SIGNIFICANT CHANGE UP (ref 30–120)
ALT FLD-CCNC: 46 U/L DA — SIGNIFICANT CHANGE UP (ref 10–60)
ANION GAP SERPL CALC-SCNC: 7 MMOL/L — SIGNIFICANT CHANGE UP (ref 5–17)
APTT BLD: 28.9 SEC — SIGNIFICANT CHANGE UP (ref 27.5–35.5)
AST SERPL-CCNC: 24 U/L — SIGNIFICANT CHANGE UP (ref 10–40)
BILIRUB SERPL-MCNC: 0.5 MG/DL — SIGNIFICANT CHANGE UP (ref 0.2–1.2)
BLD GP AB SCN SERPL QL: SIGNIFICANT CHANGE UP
BUN SERPL-MCNC: 20 MG/DL — SIGNIFICANT CHANGE UP (ref 7–23)
CALCIUM SERPL-MCNC: 9.9 MG/DL — SIGNIFICANT CHANGE UP (ref 8.4–10.5)
CHLORIDE SERPL-SCNC: 102 MMOL/L — SIGNIFICANT CHANGE UP (ref 96–108)
CO2 SERPL-SCNC: 30 MMOL/L — SIGNIFICANT CHANGE UP (ref 22–31)
CREAT SERPL-MCNC: 0.78 MG/DL — SIGNIFICANT CHANGE UP (ref 0.5–1.3)
GLUCOSE SERPL-MCNC: 192 MG/DL — HIGH (ref 70–99)
HCT VFR BLD CALC: 39.8 % — SIGNIFICANT CHANGE UP (ref 34.5–45)
HGB BLD-MCNC: 12.6 G/DL — SIGNIFICANT CHANGE UP (ref 11.5–15.5)
INR BLD: 0.99 RATIO — SIGNIFICANT CHANGE UP (ref 0.88–1.16)
MCHC RBC-ENTMCNC: 28.5 PG — SIGNIFICANT CHANGE UP (ref 27–34)
MCHC RBC-ENTMCNC: 31.7 GM/DL — LOW (ref 32–36)
MCV RBC AUTO: 90 FL — SIGNIFICANT CHANGE UP (ref 80–100)
NRBC # BLD: 0 /100 WBCS — SIGNIFICANT CHANGE UP (ref 0–0)
PLATELET # BLD AUTO: 362 K/UL — SIGNIFICANT CHANGE UP (ref 150–400)
POTASSIUM SERPL-MCNC: 4.6 MMOL/L — SIGNIFICANT CHANGE UP (ref 3.5–5.3)
POTASSIUM SERPL-SCNC: 4.6 MMOL/L — SIGNIFICANT CHANGE UP (ref 3.5–5.3)
PROT SERPL-MCNC: 7.7 G/DL — SIGNIFICANT CHANGE UP (ref 6–8.3)
PROTHROM AB SERPL-ACNC: 12 SEC — SIGNIFICANT CHANGE UP (ref 10.6–13.6)
RBC # BLD: 4.42 M/UL — SIGNIFICANT CHANGE UP (ref 3.8–5.2)
RBC # FLD: 14.7 % — HIGH (ref 10.3–14.5)
SODIUM SERPL-SCNC: 139 MMOL/L — SIGNIFICANT CHANGE UP (ref 135–145)
WBC # BLD: 6.58 K/UL — SIGNIFICANT CHANGE UP (ref 3.8–10.5)
WBC # FLD AUTO: 6.58 K/UL — SIGNIFICANT CHANGE UP (ref 3.8–10.5)

## 2021-05-06 PROCEDURE — 93010 ELECTROCARDIOGRAM REPORT: CPT

## 2021-05-06 PROCEDURE — G0463: CPT

## 2021-05-06 PROCEDURE — 93005 ELECTROCARDIOGRAM TRACING: CPT

## 2021-05-06 NOTE — H&P PST ADULT - NSICDXPASTSURGICALHX_GEN_ALL_CORE_FT
PAST SURGICAL HISTORY:  S/P cervical spinal fusion 2011, 2018    S/P Myomectomy 2000    Status post lumbar spinal fusion 4/2021

## 2021-05-06 NOTE — H&P PST ADULT - NSICDXPASTMEDICALHX_GEN_ALL_CORE_FT
PAST MEDICAL HISTORY:  Anemia mild    Class 1 obesity with body mass index (BMI) of 33.0 to 33.9 in adult     COVID-19 vaccine series completed 2/2021    Hypercholesterolemia     Hypertension     Internal orthopedic device mechanical complication     Lumbar vertebral fracture 4    LAMBERTO (Obstructive Sleep Apnea)     Seasonal allergies     Type 2 diabetes mellitus     Vertigo occurs with allergies

## 2021-05-06 NOTE — H&P PST ADULT - MUSCULOSKELETAL
no joint swelling/no joint erythema/no calf tenderness/decreased ROM due to pain detailed exam details…

## 2021-05-06 NOTE — H&P PST ADULT - NSICDXPROBLEM_GEN_ALL_CORE_FT
PROBLEM DIAGNOSES  Problem: Internal orthopedic device mechanical complication  Assessment and Plan: Revision transforaminal lumbar interbody fusion L4-5 is planned for 5/13/2021.  Diagnostiv testing performed; medical clearance requested.  COVID PCR testint is planned for 5/10/2021 @ [facility].  Pre op instructions were reviewed; best wishes offered.      Problem: Lumbar vertebral fracture  Assessment and Plan: see above       PROBLEM DIAGNOSES  Problem: Internal orthopedic device mechanical complication  Assessment and Plan: Revision transforaminal lumbar interbody fusion L4-5 is planned for 5/13/2021.  Diagnostiv testing performed; medical clearance requested.  COVID PCR testintg to be scheduled  for 5/10/21 @ [facility].  Pre op instructions were reviewed; best wishes offered.      Problem: Lumbar vertebral fracture  Assessment and Plan: see above

## 2021-05-06 NOTE — H&P PST ADULT - HISTORY OF PRESENT ILLNESS
54 yo female is scheduled for revision transforaminal lumbar interbody fusion s/p lumbar fusion 4/6/2021.  She reports pain right lower back and hip that began1 week after surgery.  She was diagnosed with L4 fracture and complication of implanted hardware.  Pain rates 10/10 and is constant.  She is using a brace to stabilize and a rolling walker.  She is scheduled for revision transforaminal lumbar interbody fusion L4-5, posterior soinal fusion L3-5, spinal instrumentation L2-5 removal of spinal implant, explantation of spinal fusion autograft, allograft on 5/13/2021.

## 2021-05-10 ENCOUNTER — APPOINTMENT (OUTPATIENT)
Dept: DISASTER EMERGENCY | Facility: CLINIC | Age: 56
End: 2021-05-10

## 2021-05-10 LAB — SARS-COV-2 N GENE NPH QL NAA+PROBE: NOT DETECTED

## 2021-05-12 ENCOUNTER — TRANSCRIPTION ENCOUNTER (OUTPATIENT)
Age: 56
End: 2021-05-12

## 2021-05-13 ENCOUNTER — INPATIENT (INPATIENT)
Facility: HOSPITAL | Age: 56
LOS: 1 days | Discharge: HOME | DRG: 454 | End: 2021-05-15
Attending: ORTHOPAEDIC SURGERY | Admitting: ORTHOPAEDIC SURGERY
Payer: COMMERCIAL

## 2021-05-13 ENCOUNTER — APPOINTMENT (OUTPATIENT)
Dept: ORTHOPEDIC SURGERY | Facility: HOSPITAL | Age: 56
End: 2021-05-13

## 2021-05-13 ENCOUNTER — RESULT REVIEW (OUTPATIENT)
Age: 56
End: 2021-05-13

## 2021-05-13 ENCOUNTER — TRANSCRIPTION ENCOUNTER (OUTPATIENT)
Age: 56
End: 2021-05-13

## 2021-05-13 VITALS
RESPIRATION RATE: 18 BRPM | WEIGHT: 196.21 LBS | SYSTOLIC BLOOD PRESSURE: 147 MMHG | TEMPERATURE: 98 F | DIASTOLIC BLOOD PRESSURE: 94 MMHG | OXYGEN SATURATION: 99 % | HEIGHT: 63 IN | HEART RATE: 91 BPM

## 2021-05-13 DIAGNOSIS — T84.498S OTHER MECHANICAL COMPLICATION OF OTHER INTERNAL ORTHOPEDIC DEVICES, IMPLANTS AND GRAFTS, SEQUELA: ICD-10-CM

## 2021-05-13 DIAGNOSIS — Z98.1 ARTHRODESIS STATUS: Chronic | ICD-10-CM

## 2021-05-13 DIAGNOSIS — Z01.818 ENCOUNTER FOR OTHER PREPROCEDURAL EXAMINATION: ICD-10-CM

## 2021-05-13 DIAGNOSIS — Z98.1 ARTHRODESIS STATUS: ICD-10-CM

## 2021-05-13 DIAGNOSIS — S32.048S: ICD-10-CM

## 2021-05-13 LAB
GLUCOSE BLDC GLUCOMTR-MCNC: 166 MG/DL — HIGH (ref 70–99)
GLUCOSE BLDC GLUCOMTR-MCNC: 233 MG/DL — HIGH (ref 70–99)
GRAM STN FLD: SIGNIFICANT CHANGE UP
HCG UR QL: NEGATIVE — SIGNIFICANT CHANGE UP
SPECIMEN SOURCE: SIGNIFICANT CHANGE UP

## 2021-05-13 PROCEDURE — 22633 ARTHRD CMBN 1NTRSPC LUMBAR: CPT | Mod: 82,78

## 2021-05-13 PROCEDURE — 99223 1ST HOSP IP/OBS HIGH 75: CPT

## 2021-05-13 PROCEDURE — 22852 REMOVE SPINE FIXATION DEVICE: CPT | Mod: 82,78

## 2021-05-13 PROCEDURE — 22853 INSJ BIOMECHANICAL DEVICE: CPT | Mod: 82

## 2021-05-13 PROCEDURE — 20939 BONE MARROW ASPIR BONE GRFG: CPT | Mod: 82,78

## 2021-05-13 PROCEDURE — 22633 ARTHRD CMBN 1NTRSPC LUMBAR: CPT | Mod: 78

## 2021-05-13 PROCEDURE — 22852 REMOVE SPINE FIXATION DEVICE: CPT | Mod: 59,78

## 2021-05-13 PROCEDURE — 88300 SURGICAL PATH GROSS: CPT | Mod: 26

## 2021-05-13 PROCEDURE — 22853 INSJ BIOMECHANICAL DEVICE: CPT | Mod: 78

## 2021-05-13 PROCEDURE — 22842 INSERT SPINE FIXATION DEVICE: CPT | Mod: 78

## 2021-05-13 PROCEDURE — 22842 INSERT SPINE FIXATION DEVICE: CPT | Mod: 82,78

## 2021-05-13 PROCEDURE — 20939 BONE MARROW ASPIR BONE GRFG: CPT | Mod: 78

## 2021-05-13 RX ORDER — APREPITANT 80 MG/1
40 CAPSULE ORAL ONCE
Refills: 0 | Status: COMPLETED | OUTPATIENT
Start: 2021-05-13 | End: 2021-05-13

## 2021-05-13 RX ORDER — HYDROMORPHONE HYDROCHLORIDE 2 MG/ML
0.5 INJECTION INTRAMUSCULAR; INTRAVENOUS; SUBCUTANEOUS
Refills: 0 | Status: DISCONTINUED | OUTPATIENT
Start: 2021-05-13 | End: 2021-05-13

## 2021-05-13 RX ORDER — ACETAMINOPHEN 500 MG
1000 TABLET ORAL ONCE
Refills: 0 | Status: COMPLETED | OUTPATIENT
Start: 2021-05-13 | End: 2021-05-13

## 2021-05-13 RX ORDER — INSULIN LISPRO 100/ML
VIAL (ML) SUBCUTANEOUS AT BEDTIME
Refills: 0 | Status: DISCONTINUED | OUTPATIENT
Start: 2021-05-13 | End: 2021-05-15

## 2021-05-13 RX ORDER — DEXTROSE 50 % IN WATER 50 %
15 SYRINGE (ML) INTRAVENOUS ONCE
Refills: 0 | Status: DISCONTINUED | OUTPATIENT
Start: 2021-05-13 | End: 2021-05-15

## 2021-05-13 RX ORDER — LISINOPRIL 2.5 MG/1
10 TABLET ORAL DAILY
Refills: 0 | Status: DISCONTINUED | OUTPATIENT
Start: 2021-05-15 | End: 2021-05-15

## 2021-05-13 RX ORDER — HYDROMORPHONE HYDROCHLORIDE 2 MG/ML
30 INJECTION INTRAMUSCULAR; INTRAVENOUS; SUBCUTANEOUS
Refills: 0 | Status: DISCONTINUED | OUTPATIENT
Start: 2021-05-13 | End: 2021-05-13

## 2021-05-13 RX ORDER — TRANEXAMIC ACID 100 MG/ML
1250 INJECTION, SOLUTION INTRAVENOUS ONCE
Refills: 0 | Status: COMPLETED | OUTPATIENT
Start: 2021-05-13 | End: 2021-05-13

## 2021-05-13 RX ORDER — OXYCODONE HYDROCHLORIDE 5 MG/1
5 TABLET ORAL
Refills: 0 | Status: DISCONTINUED | OUTPATIENT
Start: 2021-05-13 | End: 2021-05-14

## 2021-05-13 RX ORDER — GLUCAGON INJECTION, SOLUTION 0.5 MG/.1ML
1 INJECTION, SOLUTION SUBCUTANEOUS ONCE
Refills: 0 | Status: DISCONTINUED | OUTPATIENT
Start: 2021-05-13 | End: 2021-05-15

## 2021-05-13 RX ORDER — CEFAZOLIN SODIUM 1 G
2000 VIAL (EA) INJECTION EVERY 8 HOURS
Refills: 0 | Status: COMPLETED | OUTPATIENT
Start: 2021-05-13 | End: 2021-05-14

## 2021-05-13 RX ORDER — DEXTROSE 50 % IN WATER 50 %
12.5 SYRINGE (ML) INTRAVENOUS ONCE
Refills: 0 | Status: DISCONTINUED | OUTPATIENT
Start: 2021-05-13 | End: 2021-05-15

## 2021-05-13 RX ORDER — DIPHENHYDRAMINE HCL 50 MG
25 CAPSULE ORAL EVERY 8 HOURS
Refills: 0 | Status: DISCONTINUED | OUTPATIENT
Start: 2021-05-13 | End: 2021-05-15

## 2021-05-13 RX ORDER — SODIUM CHLORIDE 9 MG/ML
1000 INJECTION, SOLUTION INTRAVENOUS
Refills: 0 | Status: DISCONTINUED | OUTPATIENT
Start: 2021-05-13 | End: 2021-05-15

## 2021-05-13 RX ORDER — SIMVASTATIN 20 MG/1
20 TABLET, FILM COATED ORAL AT BEDTIME
Refills: 0 | Status: DISCONTINUED | OUTPATIENT
Start: 2021-05-13 | End: 2021-05-15

## 2021-05-13 RX ORDER — ONDANSETRON 8 MG/1
4 TABLET, FILM COATED ORAL EVERY 6 HOURS
Refills: 0 | Status: DISCONTINUED | OUTPATIENT
Start: 2021-05-13 | End: 2021-05-15

## 2021-05-13 RX ORDER — SODIUM CHLORIDE 9 MG/ML
1000 INJECTION, SOLUTION INTRAVENOUS
Refills: 0 | Status: DISCONTINUED | OUTPATIENT
Start: 2021-05-13 | End: 2021-05-13

## 2021-05-13 RX ORDER — SENNA PLUS 8.6 MG/1
2 TABLET ORAL AT BEDTIME
Refills: 0 | Status: DISCONTINUED | OUTPATIENT
Start: 2021-05-13 | End: 2021-05-15

## 2021-05-13 RX ORDER — HYDROMORPHONE HYDROCHLORIDE 2 MG/ML
0.5 INJECTION INTRAMUSCULAR; INTRAVENOUS; SUBCUTANEOUS
Refills: 0 | Status: DISCONTINUED | OUTPATIENT
Start: 2021-05-13 | End: 2021-05-15

## 2021-05-13 RX ORDER — GABAPENTIN 400 MG/1
300 CAPSULE ORAL DAILY
Refills: 0 | Status: DISCONTINUED | OUTPATIENT
Start: 2021-05-13 | End: 2021-05-15

## 2021-05-13 RX ORDER — INSULIN LISPRO 100/ML
VIAL (ML) SUBCUTANEOUS
Refills: 0 | Status: DISCONTINUED | OUTPATIENT
Start: 2021-05-13 | End: 2021-05-15

## 2021-05-13 RX ORDER — OXYCODONE HYDROCHLORIDE 5 MG/1
10 TABLET ORAL
Refills: 0 | Status: DISCONTINUED | OUTPATIENT
Start: 2021-05-13 | End: 2021-05-14

## 2021-05-13 RX ORDER — ONDANSETRON 8 MG/1
4 TABLET, FILM COATED ORAL ONCE
Refills: 0 | Status: DISCONTINUED | OUTPATIENT
Start: 2021-05-13 | End: 2021-05-13

## 2021-05-13 RX ORDER — ACETAMINOPHEN 500 MG
1000 TABLET ORAL EVERY 8 HOURS
Refills: 0 | Status: DISCONTINUED | OUTPATIENT
Start: 2021-05-14 | End: 2021-05-15

## 2021-05-13 RX ORDER — LORATADINE 10 MG/1
10 TABLET ORAL DAILY
Refills: 0 | Status: DISCONTINUED | OUTPATIENT
Start: 2021-05-13 | End: 2021-05-15

## 2021-05-13 RX ORDER — CEFAZOLIN SODIUM 1 G
2000 VIAL (EA) INJECTION ONCE
Refills: 0 | Status: COMPLETED | OUTPATIENT
Start: 2021-05-13 | End: 2021-05-13

## 2021-05-13 RX ORDER — MAGNESIUM HYDROXIDE 400 MG/1
30 TABLET, CHEWABLE ORAL EVERY 12 HOURS
Refills: 0 | Status: DISCONTINUED | OUTPATIENT
Start: 2021-05-13 | End: 2021-05-15

## 2021-05-13 RX ORDER — DEXTROSE 50 % IN WATER 50 %
25 SYRINGE (ML) INTRAVENOUS ONCE
Refills: 0 | Status: DISCONTINUED | OUTPATIENT
Start: 2021-05-13 | End: 2021-05-15

## 2021-05-13 RX ORDER — OXYCODONE HYDROCHLORIDE 5 MG/1
10 TABLET ORAL EVERY 12 HOURS
Refills: 0 | Status: DISCONTINUED | OUTPATIENT
Start: 2021-05-13 | End: 2021-05-14

## 2021-05-13 RX ORDER — DIAZEPAM 5 MG
5 TABLET ORAL EVERY 8 HOURS
Refills: 0 | Status: DISCONTINUED | OUTPATIENT
Start: 2021-05-13 | End: 2021-05-15

## 2021-05-13 RX ADMIN — HYDROMORPHONE HYDROCHLORIDE 0.5 MILLIGRAM(S): 2 INJECTION INTRAMUSCULAR; INTRAVENOUS; SUBCUTANEOUS at 15:40

## 2021-05-13 RX ADMIN — HYDROMORPHONE HYDROCHLORIDE 0.5 MILLIGRAM(S): 2 INJECTION INTRAMUSCULAR; INTRAVENOUS; SUBCUTANEOUS at 19:12

## 2021-05-13 RX ADMIN — SODIUM CHLORIDE 125 MILLILITER(S): 9 INJECTION, SOLUTION INTRAVENOUS at 23:19

## 2021-05-13 RX ADMIN — APREPITANT 40 MILLIGRAM(S): 80 CAPSULE ORAL at 09:17

## 2021-05-13 RX ADMIN — HYDROMORPHONE HYDROCHLORIDE 0.5 MILLIGRAM(S): 2 INJECTION INTRAMUSCULAR; INTRAVENOUS; SUBCUTANEOUS at 18:34

## 2021-05-13 RX ADMIN — Medication 1: at 17:30

## 2021-05-13 RX ADMIN — HYDROMORPHONE HYDROCHLORIDE 0.5 MILLIGRAM(S): 2 INJECTION INTRAMUSCULAR; INTRAVENOUS; SUBCUTANEOUS at 14:55

## 2021-05-13 RX ADMIN — Medication 100 MILLIGRAM(S): at 18:34

## 2021-05-13 RX ADMIN — SIMVASTATIN 20 MILLIGRAM(S): 20 TABLET, FILM COATED ORAL at 21:02

## 2021-05-13 RX ADMIN — OXYCODONE HYDROCHLORIDE 10 MILLIGRAM(S): 5 TABLET ORAL at 21:40

## 2021-05-13 RX ADMIN — SENNA PLUS 2 TABLET(S): 8.6 TABLET ORAL at 21:02

## 2021-05-13 RX ADMIN — HYDROMORPHONE HYDROCHLORIDE 0.5 MILLIGRAM(S): 2 INJECTION INTRAMUSCULAR; INTRAVENOUS; SUBCUTANEOUS at 15:10

## 2021-05-13 RX ADMIN — OXYCODONE HYDROCHLORIDE 10 MILLIGRAM(S): 5 TABLET ORAL at 21:02

## 2021-05-13 RX ADMIN — SODIUM CHLORIDE 75 MILLILITER(S): 9 INJECTION, SOLUTION INTRAVENOUS at 15:00

## 2021-05-13 RX ADMIN — Medication 400 MILLIGRAM(S): at 23:17

## 2021-05-13 RX ADMIN — Medication 400 MILLIGRAM(S): at 17:30

## 2021-05-13 NOTE — DISCHARGE NOTE PROVIDER - NSDCMRMEDTOKEN_GEN_ALL_CORE_FT
CeleBREX 100 mg oral capsule: 1 cap(s) orally 2 times a day  gabapentin 300 mg oral capsule: orally once a day  glipiZIDE 2.5 mg oral tablet, extended release: 1 tab(s) orally once a day  HYDROmorphone 2 mg oral tablet: 1 tab(s) orally 2 times a day, As Needed - Moderate Pain (4 - 6) -Moderate to Severe Pain (4 - 6) MDD:6    [place] [facility] ref#[id number]  lisinopril 10 mg oral tablet: 1 tab(s) orally once a day  simvastatin 20 mg oral tablet: 1 tab(s) orally once a day (at bedtime)  ZyrTEC-D 5 mg-120 mg oral tablet, extended release: 1 tab(s) orally once a day   acetaminophen 500 mg oral tablet: 2 tab(s) orally every 8 hours  ferrous sulfate 325 mg (65 mg elemental iron) oral tablet: 1 tab(s) orally once a day  gabapentin 300 mg oral capsule: orally once a day  glipiZIDE 2.5 mg oral tablet, extended release: 1 tab(s) orally once a day  HYDROmorphone 2 mg oral tablet: 1 tab(s) orally every 4 hours, As Needed -Pain MDD:6  lisinopril 10 mg oral tablet: 1 tab(s) orally once a day  senna oral tablet: 2 tab(s) orally once a day (at bedtime)  simvastatin 20 mg oral tablet: 1 tab(s) orally once a day (at bedtime)  ZyrTEC-D 5 mg-120 mg oral tablet, extended release: 1 tab(s) orally once a day

## 2021-05-13 NOTE — BRIEF OPERATIVE NOTE - NSICDXBRIEFPROCEDURE_GEN_ALL_CORE_FT
PROCEDURES:  Fusion of posterior lumbar spine 13-May-2021 14:42:07 L3-5 (L3-4 posterior fusion, L4-5 Revision of hardware,arthrodesis) Cayden Coleman

## 2021-05-13 NOTE — DISCHARGE NOTE PROVIDER - CARE PROVIDER_API CALL
Genaro Tracy (MD)  Orthopaedic Surgery  [address], [address]  [place] [address]  Phone: [phone]  Fax: [phone]  Follow Up Time: 2 weeks

## 2021-05-13 NOTE — PHYSICAL THERAPY INITIAL EVALUATION ADULT - GAIT TRAINING, PT EVAL
Goals 1-3 days, Pt will ambulate 150 ft w/ rolling walker independently.   Pt will negotiate 15 steps with rail and straight cane with supervision

## 2021-05-13 NOTE — PHYSICAL THERAPY INITIAL EVALUATION ADULT - PERTINENT HX OF CURRENT PROBLEM, REHAB EVAL
s/p Fusion of posterior lumbar spine, L3-5 (L3-4 posterior fusion, L4-5 Revision of hardware, arthrodesis)

## 2021-05-13 NOTE — DISCHARGE NOTE PROVIDER - HOSPITAL COURSE
This patient was admitted to [facility] with a history failed orthopedic hardware, pain.  Patient went to Pre-Surgical Testing at [facility] and was medically cleared to undergo elective procedure. Patient underwent L4-5 revision posterior fusion, L3-4, posterior fusion by Dr. Tracy on 5/13/2021.  No operative or jean pierre-operative complications arose during patients hospital course.  Patient received antibiotic according to SCIP guidelines for infection prevention.  SCDs was given for DVT prophylaxis.  Anesthesia, Medical Hospitalist, Physical Therapy and Occupational Therapy were consulted. Patient is stable for discharge with a good prognosis.  Appropriate discharge instructions and medications are provided in this document.

## 2021-05-13 NOTE — CONSULT NOTE ADULT - ASSESSMENT
55yFemale  with DM2 not on insulin, LAMBERTO not on CPAP, disc disease who presents electively for lumbar surgery.      Lumbar surgery  - pain control as per ortho team  - advance diet as tolerated  - anti-emetics PRN  - incentive spirometer  - bowel regimen  -PT/OT    DM2   - hold oral meds while admitted  - continue low dose insulin sliding scale with FS qAC and qHS  - diabetic diet   54yo Female  with DM2 not on insulin, LAMBERTO not on CPAP, disc disease s/p l4 laminectomy, presents to [facility] for surgical revision now admitted to the medical wards for aftercare following surgery    Lumbar disc disease s/p l4 laminectomy  Hardware failure s/p hardware revision POD day 0  - pain control as per ortho team  - advance diet as tolerated  - anti-emetics PRN  - incentive spirometer  - bowel regimen  -PT/OT    DM2   - hold oral meds while admitted  - continue low dose insulin sliding scale with FS qAC and qHS  - diabetic diet    LAMBERTO not on CPAP  nocturnal 02 if needed    dispo: pending hospital course

## 2021-05-13 NOTE — BRIEF OPERATIVE NOTE - NSICDXBRIEFPOSTOP_GEN_ALL_CORE_FT
POST-OP DIAGNOSIS:  Fracture of fourth lumbar vertebra 13-May-2021 14:41:20 s/p TLIF L4-5 Cayden Coleman

## 2021-05-13 NOTE — PHYSICAL THERAPY INITIAL EVALUATION ADULT - DID THE PATIENT HAVE SURGERY?
s/p Fusion of posterior lumbar spine, L3-5 (L3-4 posterior fusion, L4-5 Revision of hardware, arthrodesis)/yes

## 2021-05-13 NOTE — PROGRESS NOTE ADULT - SUBJECTIVE AND OBJECTIVE BOX
Orthopedic Spine Progress Note      POST OPERATIVE DAY #: 0  STATUS POST:        L4-5 revision posterior fusion, L3-4 posterior fusion         Pre-Op Dx: Fracture of fourth lumbar vertebra      Post-Op Dx:  Fracture of fourth lumbar vertebra      Prin. Procedure:  Fusion of posterior lumbar spine        SUBJECTIVE: Patient seen and examined.     Pain (0-10): 6  Current Pain Management:  [ ] PCA   [ x] Po Analgesics [x ] IM /IV Anagesics     Vital Signs Last 24 Hrs  T(C): 36.2 (13 May 2021 14:30), Max: 36.7 (13 May 2021 09:13)  T(F): 97.2 (13 May 2021 14:30), Max: 98 (13 May 2021 09:13)  HR: 83 (13 May 2021 16:15) (78 - 91)  BP: 132/76 (13 May 2021 16:15) (118/67 - 147/94)  BP(mean): --  RR: 16 (13 May 2021 16:15) (16 - 21)  SpO2: 100% (13 May 2021 16:15) (99% - 100%)  I&O's Detail    13 May 2021 07:01  -  13 May 2021 18:03  --------------------------------------------------------  IN:    Lactated Ringers: 1500 mL  Total IN: 1500 mL    OUT:    Estimated Blood Loss (mL): 350 mL    Indwelling Catheter - Urethral (mL): 200 mL  Total OUT: 550 mL    Total NET: 950 mL      OBJECTIVE:         Wound /Dressing: I/C/D  Neurological: A/O x    3           Sensation: [x ] intact to light touch  [ ] decreased:          Motor exam: [ x ]                   [ x] Lower ext.     Hip Flx  Hip Ext   Hip Abd  Hip Add Quad   Hamstrg   TA       EHL      GS                              R        5/5        4+/5        5/5             5/5        5/5        5/5        5/5     5/5      5/5                              L         5/5        5/5        5/5             5/5        5/5        5/5        5/5     5/5      5/5                                                         HV intact on self suction           RADIOLOGY & ADDITIONAL STUDIES:                                               LABS: pending    MEDICATIONS  (STANDING):  gabapentin 300 milliGRAM(s) Oral daily  insulin lispro (ADMELOG) corrective regimen sliding scale   SubCutaneous three times a day before meals  lactated ringers. 1000 milliLiter(s) (75 mL/Hr) IV Continuous <Continuous>  loratadine 10 milliGRAM(s) Oral daily  simvastatin 20 milliGRAM(s) Oral at bedtime    MEDICATIONS  (PRN):  diazepam    Tablet 5 milliGRAM(s) Oral every 8 hours PRN muscle spasm  diphenhydrAMINE 25 milliGRAM(s) Oral every 8 hours PRN Pruritus  HYDROmorphone  Injectable 0.5 milliGRAM(s) IV Push every 10 minutes PRN Moderate Pain (4 - 6)  ondansetron Injectable 4 milliGRAM(s) IV Push every 6 hours PRN Nausea and/or Vomiting  ondansetron Injectable 4 milliGRAM(s) IV Push once PRN Nausea and/or Vomiting      A/P :  55y Female   s/p:     L4-5 revision posterior fusion, L3-4 posterior fusion     -    Pain control  -    DVT ppx: SCDs    -    Abx:  Ancef IV  -    Review labs as indicated     -    Physical Therapy: WBAT in the brace   -    Weight bearing status: spine precautions   -    f/u HV output  -    Dispo: Home [ x]     Rehab [ ]

## 2021-05-13 NOTE — DISCHARGE NOTE PROVIDER - NSDCCPTREATMENT_GEN_ALL_CORE_FT
PRINCIPAL PROCEDURE  Procedure: Fusion of posterior lumbar spine  Findings and Treatment: L3-5 (L3-4 posterior fusion, L4-5 Revision of hardware,arthrodesis)

## 2021-05-13 NOTE — CONSULT NOTE ADULT - SUBJECTIVE AND OBJECTIVE BOX
55yFemale  with DM2 not on insulin, LAMBERTO not on CPAP, disc disease who presents electively for lumbar surgery.      Patient seen and examined at bedside. outpatient chart notes, clearances and recent hospitalization notes reviewed    Patient reports lower back pain. denies fever chills chest pain shortness of breath      ALLERGIES  eggs (Nausea)  No Known Drug Allergies    PAST MEDICAL AND SURGICAL  Other mechanical complication of other internal orthopedic devices, implants and grafts, sequela    Status post arthrodesis    Encounter for other preprocedural examination    Other fracture of fourth lumbar vertebra, sequela    Family history of DVT (Mother, Sibling)    Family history of type 2 diabetes mellitus in father (Father)    Family history of hypertension in father (Father)    Family history of breast cancer in sister (Sibling)    Handoff    Disc Disease with Myelopathy, Cervical    High Triglycerides    Hypercholesterolemia    LAMBERTO (Obstructive Sleep Apnea)    Diabetes Mellitus    Anemia    Vertigo    Seasonal allergies    Type 2 diabetes mellitus    Hypertension    COVID-19 vaccine series completed    Class 1 obesity with body mass index (BMI) of 33.0 to 33.9 in adult    Lumbar vertebral fracture    Internal orthopedic device mechanical complication    Fracture of fourth lumbar vertebra    Fracture of fourth lumbar vertebra    Fusion of posterior lumbar spine    S/P Myomectomy    S/P cervical spinal fusion    Status post lumbar spinal fusion    SysAdmin_VstLnk        FAMILY HISTORY  Family history of DVT (Mother, Sibling)    Family history of type 2 diabetes mellitus in father (Father)    Family history of hypertension in father (Father)    Family history of breast cancer in sister (Sibling)        SOCIAL HX: denies smoking alcohol recreational drugs    Home meds:  CeleBREX 100 mg oral capsule  gabapentin 300 mg oral capsule  glipiZIDE 2.5 mg oral tablet, extended release  HYDROmorphone 2 mg oral tablet  lisinopril 10 mg oral tablet  simvastatin 20 mg oral tablet  ZyrTEC-D 5 mg-120 mg oral tablet, extended release      Review of Systems:  General:denies fever chills, headache, weakness  HEENT: denies blurry vision,diffculty swallowing, difficulty hearing, tinnitus  Cardiovascular: denies chest pain  ,palpitations  Pulmonary:denies shortness of breath, cough, wheezing, hemoptysis  Gastrointestinal: denies abdominal pain, constipation, diarrhea,nausea , vomiting, hematochezia  : denies hematuria, dysuria, or incontinence  Neurological: denies weakness, numbness , tingling, dizziness, tremors  MSK: denies muscle pain, difficulty ambulating, swelling, back pain  skin: denies skin rash, itching, burning, or  skin lesions  Psychiatrical: denies mood disturbances, anxierty, feeling depressed, depression , or difficulty sleeping    Objective:  Vitals  T(C): 36.2 (05-13-21 @ 14:30), Max: 36.7 (05-13-21 @ 09:13)  HR: 78 (05-13-21 @ 15:45) (78 - 91)  BP: 119/63 (05-13-21 @ 15:45) (118/67 - 147/94)  RR: 19 (05-13-21 @ 15:45) (16 - 21)  SpO2: 100% (05-13-21 @ 15:45) (99% - 100%)    Physical Exam:  General: comfortable, no acute distress, well nourished  HEENT: Atraumatic, no LAD, trachea midline, PERRLA  Cardiovascular: normal s1s2, no murmurs, gallops or fricition rubs  Pulmonary: clear to ausculation Bilaterally, no wheezing , rhonchi  Gastrointestinal: soft non tender non distended, no masses felt, no organomegally  Muscloskeletal: no lower extremity edema, intact bilateral lower extremity pulses  Neurological: CN II-12 intact. No focal weakness  Psychiatrical: normal mood, cooperative  SKIN: no rash, lesions or ulcers    Labs:                    Active Medications  MEDICATIONS  (STANDING):  gabapentin 300 milliGRAM(s) Oral daily  lactated ringers. 1000 milliLiter(s) (75 mL/Hr) IV Continuous <Continuous>  loratadine 10 milliGRAM(s) Oral daily  simvastatin 20 milliGRAM(s) Oral at bedtime    MEDICATIONS  (PRN):  diazepam    Tablet 5 milliGRAM(s) Oral every 8 hours PRN muscle spasm  diphenhydrAMINE 25 milliGRAM(s) Oral every 8 hours PRN Pruritus  HYDROmorphone  Injectable 0.5 milliGRAM(s) IV Push every 10 minutes PRN Moderate Pain (4 - 6)  ondansetron Injectable 4 milliGRAM(s) IV Push every 6 hours PRN Nausea and/or Vomiting  ondansetron Injectable 4 milliGRAM(s) IV Push once PRN Nausea and/or Vomiting     54yo Female  with DM2 not on insulin, LAMBERTO not on CPAP, disc disease s/p l4 laminectomy, presents to  for surgical revision now admitted to the medical wards for aftercare following surgery    As per patient reports pain right lower back and hip that began1 week after surgery. patient rated pain pre-surgery 10/1. she is using a brace to stabilize and a rolling walker.    She is now s/p rvision transforaminal lumbar interbody fusion L4-5, posterior soinal fusion L3-5, spinal instrumentation L2-5 removal of spinal implant, explantation of spinal fusion autograft, allograft on 5/13/2021.        Patient seen and examined at bedside. outpatient chart notes, clearances and recent hospitalization notes reviewed    Patient reports lower back pain. denies fever chills chest pain shortness of breath      ALLERGIES  eggs (Nausea)  No Known Drug Allergies    PAST MEDICAL AND SURGICAL  Other mechanical complication of other internal orthopedic devices, implants and grafts, sequela    Status post arthrodesis    Encounter for other preprocedural examination    Other fracture of fourth lumbar vertebra, sequela    Family history of DVT (Mother, Sibling)    Family history of type 2 diabetes mellitus in father (Father)    Family history of hypertension in father (Father)    Family history of breast cancer in sister (Sibling)    Handoff    Disc Disease with Myelopathy, Cervical    High Triglycerides    Hypercholesterolemia    LAMBERTO (Obstructive Sleep Apnea)    Diabetes Mellitus    Anemia    Vertigo    Seasonal allergies    Type 2 diabetes mellitus    Hypertension    COVID-19 vaccine series completed    Class 1 obesity with body mass index (BMI) of 33.0 to 33.9 in adult    Lumbar vertebral fracture    Internal orthopedic device mechanical complication    Fracture of fourth lumbar vertebra    Fracture of fourth lumbar vertebra    Fusion of posterior lumbar spine    S/P Myomectomy    S/P cervical spinal fusion    Status post lumbar spinal fusion    SysAdmin_VstLnk        FAMILY HISTORY  Family history of DVT (Mother, Sibling)    Family history of type 2 diabetes mellitus in father (Father)    Family history of hypertension in father (Father)    Family history of breast cancer in sister (Sibling)        SOCIAL HX: denies smoking alcohol recreational drugs    Home meds:  CeleBREX 100 mg oral capsule  gabapentin 300 mg oral capsule  glipiZIDE 2.5 mg oral tablet, extended release  HYDROmorphone 2 mg oral tablet  lisinopril 10 mg oral tablet  simvastatin 20 mg oral tablet  ZyrTEC-D 5 mg-120 mg oral tablet, extended release      Review of Systems:  General:denies fever chills, headache, weakness  HEENT: denies blurry vision,diffculty swallowing, difficulty hearing, tinnitus  Cardiovascular: denies chest pain  ,palpitations  Pulmonary:denies shortness of breath, cough, wheezing, hemoptysis  Gastrointestinal: denies abdominal pain, constipation, diarrhea,nausea , vomiting, hematochezia  : denies hematuria, dysuria, or incontinence  Neurological: denies weakness, numbness , tingling, dizziness, tremors  MSK: denies muscle pain, difficulty ambulating, swelling, back pain  skin: denies skin rash, itching, burning, or  skin lesions  Psychiatrical: denies mood disturbances, anxierty, feeling depressed, depression , or difficulty sleeping    Objective:  Vitals  T(C): 36.2 (05-13-21 @ 14:30), Max: 36.7 (05-13-21 @ 09:13)  HR: 78 (05-13-21 @ 15:45) (78 - 91)  BP: 119/63 (05-13-21 @ 15:45) (118/67 - 147/94)  RR: 19 (05-13-21 @ 15:45) (16 - 21)  SpO2: 100% (05-13-21 @ 15:45) (99% - 100%)    Physical Exam:  General: comfortable, no acute distress, well nourished  HEENT: Atraumatic, no LAD, trachea midline, PERRLA  Cardiovascular: normal s1s2, no murmurs, gallops or fricition rubs  Pulmonary: clear to ausculation Bilaterally, no wheezing , rhonchi  Gastrointestinal: soft non tender non distended, no masses felt, no organomegally  Muscloskeletal: no lower extremity edema, intact bilateral lower extremity pulses  Neurological: CN II-12 intact. No focal weakness  Psychiatrical: normal mood, cooperative  SKIN: no rash, lesions or ulcers    Labs:                    Active Medications  MEDICATIONS  (STANDING):  gabapentin 300 milliGRAM(s) Oral daily  lactated ringers. 1000 milliLiter(s) (75 mL/Hr) IV Continuous <Continuous>  loratadine 10 milliGRAM(s) Oral daily  simvastatin 20 milliGRAM(s) Oral at bedtime    MEDICATIONS  (PRN):  diazepam    Tablet 5 milliGRAM(s) Oral every 8 hours PRN muscle spasm  diphenhydrAMINE 25 milliGRAM(s) Oral every 8 hours PRN Pruritus  HYDROmorphone  Injectable 0.5 milliGRAM(s) IV Push every 10 minutes PRN Moderate Pain (4 - 6)  ondansetron Injectable 4 milliGRAM(s) IV Push every 6 hours PRN Nausea and/or Vomiting  ondansetron Injectable 4 milliGRAM(s) IV Push once PRN Nausea and/or Vomiting

## 2021-05-13 NOTE — PHYSICAL THERAPY INITIAL EVALUATION ADULT - ADDITIONAL COMMENTS
Pt lives with parents in a house w/ 4-5 steps to enter with rail.  There are 13 steps inside with rail.  Pt has rolling walker, straight cane and LSO brace

## 2021-05-13 NOTE — BRIEF OPERATIVE NOTE - NSICDXBRIEFPREOP_GEN_ALL_CORE_FT
PRE-OP DIAGNOSIS:  Fracture of fourth lumbar vertebra 13-May-2021 14:38:47 s/p TLIF L4-5 Cayden Coleman

## 2021-05-13 NOTE — CONSULT NOTE ADULT - TIME BILLING
Greater than 55 minutes spent on patient encounter, activities included direct patient care, counseling and or coordinating care , reviewing notes, lab data/imaging, and discussion with multidisciplinary team

## 2021-05-13 NOTE — PHYSICAL THERAPY INITIAL EVALUATION ADULT - GENERAL OBSERVATIONS, REHAB EVAL
pt received semisupine in bed, +IV, +hemovac, +SCDs, +tele pt received semisupine in bed, +IV, +hemovac, +SCDs, +tele, +Baker

## 2021-05-13 NOTE — PHYSICAL THERAPY INITIAL EVALUATION ADULT - DISCHARGE PLANNER MADE AWARE
Detail Level: Simple Quality 224: Stage 0-Iic Melanoma: Overutilization Of Imaging Studies For Only Stage 0-Iic Melanoma: None of the following diagnostic imaging studies ordered: chest X-ray, CT, Ultrasound, MRI, PET, or nuclear medicine scans (ML) Quality 137: Melanoma: Continuity Of Care - Recall System: Patient information entered into a recall system that includes: target date for the next exam specified AND a process to follow up with patients regarding missed or unscheduled appointments yes

## 2021-05-13 NOTE — DISCHARGE NOTE PROVIDER - NSDCCPCAREPLAN_GEN_ALL_CORE_FT
PRINCIPAL DISCHARGE DIAGNOSIS  Diagnosis: Painful orthopaedic hardware  Assessment and Plan of Treatment: Must wear lumbar brace when ambulating   No heavy lifting. Do not lift more than 10 pounds.  Avoid twisting and bending over. Do NOT drive a car.  You may be driven in a car.  You may shower if the dressing is the clear plastic type or if you cover the existing dressing with plastic and tape to prevent it from getting wet.  Change dressing with dry, sterile gauze and tape if it becomes loose, wet or soiled.    Observe low back precautions as described by the Physical Therapist.  Walking is your best exercise.  It is best not to remain in one position for long periods such as long car rides. Call the doctor with questions, fevers, severe headache, For Constipation :   • Increase your water intake. Drink at least 8 glasses of water daily.  • Try adding fiber to your diet by eating fruits, vegetables and foods that are rich in grains.  • If you do experience constipation, you may take an over-the-counter stool softener/laxative such as Beatriz Colace, [place] or  Milk of Magnesia. or bladder dysfunction, new numbness/weakness or new pain not relieved by medication.

## 2021-05-13 NOTE — DISCHARGE NOTE PROVIDER - INSTRUCTIONS
For Constipation :   • Increase your water intake. Drink at least 8 glasses of water daily.  • Try adding fiber to your diet by eating fruits, vegetables and foods that are rich in grains.  • If you do experience constipation, you may take an over-the-counter stool softener/laxative

## 2021-05-14 ENCOUNTER — TRANSCRIPTION ENCOUNTER (OUTPATIENT)
Age: 56
End: 2021-05-14

## 2021-05-14 LAB
A1C WITH ESTIMATED AVERAGE GLUCOSE RESULT: 6.9 % — HIGH (ref 4–5.6)
ANION GAP SERPL CALC-SCNC: 8 MMOL/L — SIGNIFICANT CHANGE UP (ref 5–17)
BASOPHILS # BLD AUTO: 0.02 K/UL — SIGNIFICANT CHANGE UP (ref 0–0.2)
BASOPHILS NFR BLD AUTO: 0.2 % — SIGNIFICANT CHANGE UP (ref 0–2)
BUN SERPL-MCNC: 15 MG/DL — SIGNIFICANT CHANGE UP (ref 7–23)
CALCIUM SERPL-MCNC: 9.2 MG/DL — SIGNIFICANT CHANGE UP (ref 8.4–10.5)
CHLORIDE SERPL-SCNC: 103 MMOL/L — SIGNIFICANT CHANGE UP (ref 96–108)
CO2 SERPL-SCNC: 27 MMOL/L — SIGNIFICANT CHANGE UP (ref 22–31)
COVID-19 SPIKE DOMAIN AB INTERP: POSITIVE
COVID-19 SPIKE DOMAIN ANTIBODY RESULT: >250 U/ML — HIGH
CREAT SERPL-MCNC: 0.79 MG/DL — SIGNIFICANT CHANGE UP (ref 0.5–1.3)
EOSINOPHIL # BLD AUTO: 0.01 K/UL — SIGNIFICANT CHANGE UP (ref 0–0.5)
EOSINOPHIL NFR BLD AUTO: 0.1 % — SIGNIFICANT CHANGE UP (ref 0–6)
ESTIMATED AVERAGE GLUCOSE: 151 MG/DL — HIGH (ref 68–114)
GLUCOSE BLDC GLUCOMTR-MCNC: 163 MG/DL — HIGH (ref 70–99)
GLUCOSE BLDC GLUCOMTR-MCNC: 167 MG/DL — HIGH (ref 70–99)
GLUCOSE BLDC GLUCOMTR-MCNC: 179 MG/DL — HIGH (ref 70–99)
GLUCOSE BLDC GLUCOMTR-MCNC: 202 MG/DL — HIGH (ref 70–99)
GLUCOSE BLDC GLUCOMTR-MCNC: 215 MG/DL — HIGH (ref 70–99)
GLUCOSE SERPL-MCNC: 177 MG/DL — HIGH (ref 70–99)
HCT VFR BLD CALC: 31.7 % — LOW (ref 34.5–45)
HGB BLD-MCNC: 10.1 G/DL — LOW (ref 11.5–15.5)
IMM GRANULOCYTES NFR BLD AUTO: 0.4 % — SIGNIFICANT CHANGE UP (ref 0–1.5)
LYMPHOCYTES # BLD AUTO: 1.56 K/UL — SIGNIFICANT CHANGE UP (ref 1–3.3)
LYMPHOCYTES # BLD AUTO: 14.7 % — SIGNIFICANT CHANGE UP (ref 13–44)
MCHC RBC-ENTMCNC: 28.5 PG — SIGNIFICANT CHANGE UP (ref 27–34)
MCHC RBC-ENTMCNC: 31.9 GM/DL — LOW (ref 32–36)
MCV RBC AUTO: 89.5 FL — SIGNIFICANT CHANGE UP (ref 80–100)
MONOCYTES # BLD AUTO: 0.69 K/UL — SIGNIFICANT CHANGE UP (ref 0–0.9)
MONOCYTES NFR BLD AUTO: 6.5 % — SIGNIFICANT CHANGE UP (ref 2–14)
NEUTROPHILS # BLD AUTO: 8.3 K/UL — HIGH (ref 1.8–7.4)
NEUTROPHILS NFR BLD AUTO: 78.1 % — HIGH (ref 43–77)
NRBC # BLD: 0 /100 WBCS — SIGNIFICANT CHANGE UP (ref 0–0)
PLATELET # BLD AUTO: 276 K/UL — SIGNIFICANT CHANGE UP (ref 150–400)
POTASSIUM SERPL-MCNC: 4.1 MMOL/L — SIGNIFICANT CHANGE UP (ref 3.5–5.3)
POTASSIUM SERPL-SCNC: 4.1 MMOL/L — SIGNIFICANT CHANGE UP (ref 3.5–5.3)
RBC # BLD: 3.54 M/UL — LOW (ref 3.8–5.2)
RBC # FLD: 14.4 % — SIGNIFICANT CHANGE UP (ref 10.3–14.5)
SARS-COV-2 IGG+IGM SERPL QL IA: >250 U/ML — HIGH
SARS-COV-2 IGG+IGM SERPL QL IA: POSITIVE
SODIUM SERPL-SCNC: 138 MMOL/L — SIGNIFICANT CHANGE UP (ref 135–145)
WBC # BLD: 10.62 K/UL — HIGH (ref 3.8–10.5)
WBC # FLD AUTO: 10.62 K/UL — HIGH (ref 3.8–10.5)

## 2021-05-14 PROCEDURE — 99233 SBSQ HOSP IP/OBS HIGH 50: CPT

## 2021-05-14 RX ORDER — HYDROMORPHONE HYDROCHLORIDE 2 MG/ML
2 INJECTION INTRAMUSCULAR; INTRAVENOUS; SUBCUTANEOUS
Refills: 0 | Status: DISCONTINUED | OUTPATIENT
Start: 2021-05-14 | End: 2021-05-15

## 2021-05-14 RX ORDER — POLYETHYLENE GLYCOL 3350 17 G/17G
17 POWDER, FOR SOLUTION ORAL DAILY
Refills: 0 | Status: DISCONTINUED | OUTPATIENT
Start: 2021-05-14 | End: 2021-05-15

## 2021-05-14 RX ORDER — OXYCODONE HYDROCHLORIDE 5 MG/1
10 TABLET ORAL ONCE
Refills: 0 | Status: DISCONTINUED | OUTPATIENT
Start: 2021-05-14 | End: 2021-05-14

## 2021-05-14 RX ORDER — HYDROMORPHONE HYDROCHLORIDE 2 MG/ML
4 INJECTION INTRAMUSCULAR; INTRAVENOUS; SUBCUTANEOUS
Refills: 0 | Status: DISCONTINUED | OUTPATIENT
Start: 2021-05-14 | End: 2021-05-15

## 2021-05-14 RX ORDER — FERROUS SULFATE 325(65) MG
325 TABLET ORAL DAILY
Refills: 0 | Status: DISCONTINUED | OUTPATIENT
Start: 2021-05-14 | End: 2021-05-15

## 2021-05-14 RX ADMIN — OXYCODONE HYDROCHLORIDE 10 MILLIGRAM(S): 5 TABLET ORAL at 02:45

## 2021-05-14 RX ADMIN — OXYCODONE HYDROCHLORIDE 10 MILLIGRAM(S): 5 TABLET ORAL at 19:00

## 2021-05-14 RX ADMIN — HYDROMORPHONE HYDROCHLORIDE 4 MILLIGRAM(S): 2 INJECTION INTRAMUSCULAR; INTRAVENOUS; SUBCUTANEOUS at 15:53

## 2021-05-14 RX ADMIN — Medication 100 MILLIGRAM(S): at 02:10

## 2021-05-14 RX ADMIN — SIMVASTATIN 20 MILLIGRAM(S): 20 TABLET, FILM COATED ORAL at 21:20

## 2021-05-14 RX ADMIN — Medication 1000 MILLIGRAM(S): at 12:08

## 2021-05-14 RX ADMIN — Medication 2: at 12:05

## 2021-05-14 RX ADMIN — Medication 325 MILLIGRAM(S): at 16:59

## 2021-05-14 RX ADMIN — OXYCODONE HYDROCHLORIDE 10 MILLIGRAM(S): 5 TABLET ORAL at 02:12

## 2021-05-14 RX ADMIN — GABAPENTIN 300 MILLIGRAM(S): 400 CAPSULE ORAL at 12:05

## 2021-05-14 RX ADMIN — OXYCODONE HYDROCHLORIDE 10 MILLIGRAM(S): 5 TABLET ORAL at 09:33

## 2021-05-14 RX ADMIN — Medication 1000 MILLIGRAM(S): at 22:50

## 2021-05-14 RX ADMIN — POLYETHYLENE GLYCOL 3350 17 GRAM(S): 17 POWDER, FOR SOLUTION ORAL at 21:20

## 2021-05-14 RX ADMIN — Medication 1000 MILLIGRAM(S): at 21:20

## 2021-05-14 RX ADMIN — Medication 1000 MILLIGRAM(S): at 05:12

## 2021-05-14 RX ADMIN — SENNA PLUS 2 TABLET(S): 8.6 TABLET ORAL at 21:20

## 2021-05-14 RX ADMIN — OXYCODONE HYDROCHLORIDE 10 MILLIGRAM(S): 5 TABLET ORAL at 10:10

## 2021-05-14 RX ADMIN — OXYCODONE HYDROCHLORIDE 10 MILLIGRAM(S): 5 TABLET ORAL at 18:28

## 2021-05-14 RX ADMIN — Medication 1000 MILLIGRAM(S): at 12:07

## 2021-05-14 RX ADMIN — HYDROMORPHONE HYDROCHLORIDE 4 MILLIGRAM(S): 2 INJECTION INTRAMUSCULAR; INTRAVENOUS; SUBCUTANEOUS at 16:30

## 2021-05-14 RX ADMIN — OXYCODONE HYDROCHLORIDE 10 MILLIGRAM(S): 5 TABLET ORAL at 05:12

## 2021-05-14 NOTE — DISCHARGE NOTE NURSING/CASE MANAGEMENT/SOCIAL WORK - CASE MANAGER'S NAME
[facility] RN/ Sherlyn Paredes can be reached at [phone] or main office # [phone]

## 2021-05-14 NOTE — OCCUPATIONAL THERAPY INITIAL EVALUATION ADULT - ADDITIONAL COMMENTS
Patient lives at home with parents. 5 RHONDA +HR. 13 steps inside home. Patient has +stall shower upstairs. Patient owns RW, cane, shower chair.

## 2021-05-14 NOTE — PROGRESS NOTE ADULT - SUBJECTIVE AND OBJECTIVE BOX
Patient seen and examined at bedside.  denies fever chills  ambulating today  on IVF  incisional abdominal pain improved  Vitals reviesed: stable  labs reviewed : increase in white count to 10  drop in hemoglobin to 10    Review of Systems:  General:denies fever chills, headache, weakness  HEENT: denies blurry vision,diffculty swallowing, difficulty hearing, tinnitus  Cardiovascular: denies chest pain  ,palpitations  Pulmonary:denies shortness of breath, cough, wheezing, hemoptysis  Gastrointestinal: denies abdominal pain, constipation, diarrhea,nausea , vomiting, hematochezia  : denies hematuria, dysuria, or incontinence  Neurological: denies weakness, numbness , tingling, dizziness, tremors  MSK: denies muscle pain, difficulty ambulating, swelling, back pain  skin: denies skin rash, itching, burning, or  skin lesions  Psychiatrical: denies mood disturbances, anxierty, feeling depressed, depression , or difficulty sleeping    Objective:  Vitals  T(C): 36.8 (05-14-21 @ 11:54), Max: 37.1 (05-13-21 @ 22:16)  HR: 68 (05-14-21 @ 11:54) (64 - 90)  BP: 111/73 (05-14-21 @ 11:54) (109/67 - 145/79)  RR: 18 (05-14-21 @ 11:54) (15 - 21)  SpO2: 99% (05-14-21 @ 11:54) (97% - 100%)    Physical Exam:  General: comfortable, no acute distress, well nourished  HEENT: Atraumatic, no LAD, trachea midline, PERRLA  Cardiovascular: normal s1s2, no murmurs, gallops or fricition rubs  Pulmonary: clear to ausculation Bilaterally, no wheezing , rhonchi  Gastrointestinal: soft non tender non distended, no masses felt, no organomegally  Muscloskeletal: no lower extremity edema, intact bilateral lower extremity pulses  Neurological: CN II-12 intact. No focal weakness  Psychiatrical: normal mood, cooperative  SKIN: no rash, lesions or ulcers    Labs:                          10.1   10.62 )-----------( 276      ( 14 May 2021 08:00 )             31.7     05-14    138  |  103  |  15  ----------------------------<  177<H>  4.1   |  27  |  0.79    Ca    9.2      14 May 2021 08:00              Active Medications  MEDICATIONS  (STANDING):  acetaminophen   Tablet .. 1000 milliGRAM(s) Oral every 8 hours  dextrose 40% Gel 15 Gram(s) Oral once  dextrose 5%. 1000 milliLiter(s) (50 mL/Hr) IV Continuous <Continuous>  dextrose 5%. 1000 milliLiter(s) (100 mL/Hr) IV Continuous <Continuous>  dextrose 50% Injectable 25 Gram(s) IV Push once  dextrose 50% Injectable 12.5 Gram(s) IV Push once  dextrose 50% Injectable 25 Gram(s) IV Push once  gabapentin 300 milliGRAM(s) Oral daily  glucagon  Injectable 1 milliGRAM(s) IntraMuscular once  insulin lispro (ADMELOG) corrective regimen sliding scale   SubCutaneous three times a day before meals  insulin lispro (ADMELOG) corrective regimen sliding scale   SubCutaneous at bedtime  lactated ringers. 1000 milliLiter(s) (125 mL/Hr) IV Continuous <Continuous>  loratadine 10 milliGRAM(s) Oral daily  oxyCODONE  ER Tablet 10 milliGRAM(s) Oral once  senna 2 Tablet(s) Oral at bedtime  simvastatin 20 milliGRAM(s) Oral at bedtime    MEDICATIONS  (PRN):  diazepam    Tablet 5 milliGRAM(s) Oral every 8 hours PRN muscle spasm  diphenhydrAMINE 25 milliGRAM(s) Oral every 8 hours PRN Pruritus  HYDROmorphone   Tablet 2 milliGRAM(s) Oral every 3 hours PRN Moderate Pain (4 - 6)  HYDROmorphone   Tablet 4 milliGRAM(s) Oral every 3 hours PRN Severe Pain (7 - 10)  HYDROmorphone  Injectable 0.5 milliGRAM(s) IV Push every 3 hours PRN breakthrough pain  magnesium hydroxide Suspension 30 milliLiter(s) Oral every 12 hours PRN Constipation  ondansetron Injectable 4 milliGRAM(s) IV Push every 6 hours PRN Nausea and/or Vomiting     Patient seen and examined at bedside.  denies fever chills  reports the oxycodone she is takingis not suiting her and prefers dilauiid  ambulating today  incisional back pain improved  Vitals reviesed: stable  labs reviewed : increase in white count to 10  drop in hemoglobin to 10    Review of Systems:  General:denies fever chills, headache, weakness  HEENT: denies blurry vision,diffculty swallowing, difficulty hearing, tinnitus  Cardiovascular: denies chest pain  ,palpitations  Pulmonary:denies shortness of breath, cough, wheezing, hemoptysis  Gastrointestinal: denies abdominal pain, constipation, diarrhea,nausea , vomiting, hematochezia  : denies hematuria, dysuria, or incontinence  Neurological: denies weakness, numbness , tingling, dizziness, tremors  MSK: denies muscle pain, difficulty ambulating, swelling, back pain  skin: denies skin rash, itching, burning, or  skin lesions  Psychiatrical: denies mood disturbances, anxierty, feeling depressed, depression , or difficulty sleeping    Objective:  Vitals  T(C): 36.8 (05-14-21 @ 11:54), Max: 37.1 (05-13-21 @ 22:16)  HR: 68 (05-14-21 @ 11:54) (64 - 90)  BP: 111/73 (05-14-21 @ 11:54) (109/67 - 145/79)  RR: 18 (05-14-21 @ 11:54) (15 - 21)  SpO2: 99% (05-14-21 @ 11:54) (97% - 100%)    Physical Exam:  General: comfortable, no acute distress, well nourished  HEENT: Atraumatic, no LAD, trachea midline, PERRLA  Cardiovascular: normal s1s2, no murmurs, gallops or fricition rubs  Pulmonary: clear to ausculation Bilaterally, no wheezing , rhonchi  Gastrointestinal: soft non tender non distended, no masses felt, no organomegally  Muscloskeletal: no lower extremity edema, intact bilateral lower extremity pulses  Neurological: CN II-12 intact. No focal weakness  Psychiatrical: normal mood, cooperative  SKIN: no rash, lesions or ulcers    Labs:                          10.1   10.62 )-----------( 276      ( 14 May 2021 08:00 )             31.7     05-14    138  |  103  |  15  ----------------------------<  177<H>  4.1   |  27  |  0.79    Ca    9.2      14 May 2021 08:00              Active Medications  MEDICATIONS  (STANDING):  acetaminophen   Tablet .. 1000 milliGRAM(s) Oral every 8 hours  dextrose 40% Gel 15 Gram(s) Oral once  dextrose 5%. 1000 milliLiter(s) (50 mL/Hr) IV Continuous <Continuous>  dextrose 5%. 1000 milliLiter(s) (100 mL/Hr) IV Continuous <Continuous>  dextrose 50% Injectable 25 Gram(s) IV Push once  dextrose 50% Injectable 12.5 Gram(s) IV Push once  dextrose 50% Injectable 25 Gram(s) IV Push once  gabapentin 300 milliGRAM(s) Oral daily  glucagon  Injectable 1 milliGRAM(s) IntraMuscular once  insulin lispro (ADMELOG) corrective regimen sliding scale   SubCutaneous three times a day before meals  insulin lispro (ADMELOG) corrective regimen sliding scale   SubCutaneous at bedtime  lactated ringers. 1000 milliLiter(s) (125 mL/Hr) IV Continuous <Continuous>  loratadine 10 milliGRAM(s) Oral daily  oxyCODONE  ER Tablet 10 milliGRAM(s) Oral once  senna 2 Tablet(s) Oral at bedtime  simvastatin 20 milliGRAM(s) Oral at bedtime    MEDICATIONS  (PRN):  diazepam    Tablet 5 milliGRAM(s) Oral every 8 hours PRN muscle spasm  diphenhydrAMINE 25 milliGRAM(s) Oral every 8 hours PRN Pruritus  HYDROmorphone   Tablet 2 milliGRAM(s) Oral every 3 hours PRN Moderate Pain (4 - 6)  HYDROmorphone   Tablet 4 milliGRAM(s) Oral every 3 hours PRN Severe Pain (7 - 10)  HYDROmorphone  Injectable 0.5 milliGRAM(s) IV Push every 3 hours PRN breakthrough pain  magnesium hydroxide Suspension 30 milliLiter(s) Oral every 12 hours PRN Constipation  ondansetron Injectable 4 milliGRAM(s) IV Push every 6 hours PRN Nausea and/or Vomiting

## 2021-05-14 NOTE — PROGRESS NOTE ADULT - ASSESSMENT
56yo Female  with DM2 not on insulin, LAMBERTO not on CPAP, disc disease s/p l4 laminectomy, presents to  for surgical revision now admitted to the medical wards for aftercare following surgery    Lumbar disc disease s/p l4 laminectomy  Hardware failure s/p hardware revision POD day 1  - pain control as per ortho team  - advance diet as tolerated  - anti-emetics PRN  - incentive spirometer  - bowel regimen  -PT/OT    Leukocytosis  likely physiological due to acute inflammation from surgery  monitor    acute blood loss anemia  likely due to surgery  monitor    DM2   - hold oral meds while admitted  - continue low dose insulin sliding scale with FS qAC and qHS  - diabetic diet    LAMBERTO not on CPAP  nocturnal 02 if needed

## 2021-05-14 NOTE — DISCHARGE NOTE NURSING/CASE MANAGEMENT/SOCIAL WORK - PATIENT PORTAL LINK FT
You can access the FollowMyHealth Patient Portal offered by [facility] by registering at the following website: [email or web address]. By joining [email or web address]’s FollowMyHealth portal, you will also be able to view your health information using other applications (apps) compatible with our system.

## 2021-05-14 NOTE — OCCUPATIONAL THERAPY INITIAL EVALUATION ADULT - ADL RETRAINING, OT EVAL
Patient will perform grooming while standing at sink I'ly within 1-2 sessions. Patient will perform LE dressing and maintain spinal precautions I'ly within 1-2 sessions.

## 2021-05-14 NOTE — PROGRESS NOTE ADULT - SUBJECTIVE AND OBJECTIVE BOX
Post Op     SAMI DAVIS      55y        Female                                                                                                                 T(C): 37.1 (05-14-21 @ 07:34), Max: 37.1 (05-13-21 @ 22:16)  HR: 82 (05-14-21 @ 07:34) (64 - 90)  BP: 109/67 (05-14-21 @ 07:34) (109/67 - 145/79)  RR: 18 (05-14-21 @ 07:34) (15 - 21)  SpO2: 100% (05-14-21 @ 07:34) (97% - 100%)  Wt(kg): --    S/P  revision t lif- L4/5 posterior spinal fusion L3-5    Patient denies shortness of breath, chest pain, dyspnea, No complaints  Pain is 3 /10 symptoms  improveing  right sided thigh pain decreased    Physical Exam    Extremity: Bilaterally:  No holmon                                           No Cord                                          PAS on                                          Neurovascular intact                                          Motor intact EHL/FHL                                          Sensation intact                                          Pulses intact DP/PT                                         Calves Soft                                         Dressing Clean / Dry / Intact hv  90  overnight     30  at  900 am                                           Capillary refill with 5 seconds                          10.1   10.62 )-----------( 276      ( 14 May 2021 08:00 )             31.7       05-14    138  |  103  |  15  ----------------------------<  177<H>  4.1   |  27  |  0.79    Ca    9.2      14 May 2021 08:00        A/P  -- S/P   revision t lif- L4/5 posterior spinal fusion L3-5    -  Medicine To Follow   - DVT prophylaxis PAS  - PT & OT   - Analagesia  - Incentive Spirometry  - Discharge Planning  - Safety Precautions  -  CBC , BMP daily

## 2021-05-14 NOTE — PROGRESS NOTE ADULT - TIME BILLING
Greater than 45 minute spent on patient encounter, activities included direct patient care, counseling and or coordinating care , reviewing notes, lab data/imaging, and discussion with multidisciplinary team

## 2021-05-14 NOTE — PROGRESS NOTE ADULT - SUBJECTIVE AND OBJECTIVE BOX
Discharge medication calendar:  APAP 1000mg q8h x 2-3 weeks  Hydromorphone PRN  Docusate 100mg TID while taking narcotic  Miralax, Senna, or Bisacodyl PRN for treatment of constipation

## 2021-05-15 VITALS
HEART RATE: 81 BPM | SYSTOLIC BLOOD PRESSURE: 114 MMHG | DIASTOLIC BLOOD PRESSURE: 71 MMHG | RESPIRATION RATE: 15 BRPM | OXYGEN SATURATION: 100 % | TEMPERATURE: 98 F

## 2021-05-15 LAB
GLUCOSE BLDC GLUCOMTR-MCNC: 152 MG/DL — HIGH (ref 70–99)
GLUCOSE BLDC GLUCOMTR-MCNC: 166 MG/DL — HIGH (ref 70–99)

## 2021-05-15 PROCEDURE — 97530 THERAPEUTIC ACTIVITIES: CPT

## 2021-05-15 PROCEDURE — C1713: CPT

## 2021-05-15 PROCEDURE — C1889: CPT

## 2021-05-15 PROCEDURE — 97161 PT EVAL LOW COMPLEX 20 MIN: CPT

## 2021-05-15 PROCEDURE — 36415 COLL VENOUS BLD VENIPUNCTURE: CPT

## 2021-05-15 PROCEDURE — 76000 FLUOROSCOPY <1 HR PHYS/QHP: CPT

## 2021-05-15 PROCEDURE — 86769 SARS-COV-2 COVID-19 ANTIBODY: CPT

## 2021-05-15 PROCEDURE — 83036 HEMOGLOBIN GLYCOSYLATED A1C: CPT

## 2021-05-15 PROCEDURE — 94664 DEMO&/EVAL PT USE INHALER: CPT

## 2021-05-15 PROCEDURE — 81025 URINE PREGNANCY TEST: CPT

## 2021-05-15 PROCEDURE — 88300 SURGICAL PATH GROSS: CPT

## 2021-05-15 PROCEDURE — 97165 OT EVAL LOW COMPLEX 30 MIN: CPT

## 2021-05-15 PROCEDURE — 97116 GAIT TRAINING THERAPY: CPT

## 2021-05-15 PROCEDURE — 99233 SBSQ HOSP IP/OBS HIGH 50: CPT

## 2021-05-15 PROCEDURE — 87075 CULTR BACTERIA EXCEPT BLOOD: CPT

## 2021-05-15 PROCEDURE — 87070 CULTURE OTHR SPECIMN AEROBIC: CPT

## 2021-05-15 PROCEDURE — 80048 BASIC METABOLIC PNL TOTAL CA: CPT

## 2021-05-15 PROCEDURE — 85025 COMPLETE CBC W/AUTO DIFF WBC: CPT

## 2021-05-15 PROCEDURE — 97535 SELF CARE MNGMENT TRAINING: CPT

## 2021-05-15 PROCEDURE — 82962 GLUCOSE BLOOD TEST: CPT

## 2021-05-15 RX ORDER — HYDROMORPHONE HYDROCHLORIDE 2 MG/ML
1 INJECTION INTRAMUSCULAR; INTRAVENOUS; SUBCUTANEOUS
Qty: 20 | Refills: 0
Start: 2021-05-15

## 2021-05-15 RX ORDER — ACETAMINOPHEN 500 MG
2 TABLET ORAL
Qty: 0 | Refills: 0 | DISCHARGE
Start: 2021-05-15

## 2021-05-15 RX ORDER — CELECOXIB 200 MG/1
1 CAPSULE ORAL
Qty: 0 | Refills: 0 | DISCHARGE

## 2021-05-15 RX ORDER — FERROUS SULFATE 325(65) MG
1 TABLET ORAL
Qty: 14 | Refills: 0
Start: 2021-05-15 | End: 2021-05-28

## 2021-05-15 RX ORDER — SENNA PLUS 8.6 MG/1
2 TABLET ORAL
Qty: 0 | Refills: 0 | DISCHARGE
Start: 2021-05-15

## 2021-05-15 RX ADMIN — Medication 1: at 12:35

## 2021-05-15 RX ADMIN — HYDROMORPHONE HYDROCHLORIDE 2 MILLIGRAM(S): 2 INJECTION INTRAMUSCULAR; INTRAVENOUS; SUBCUTANEOUS at 09:13

## 2021-05-15 RX ADMIN — HYDROMORPHONE HYDROCHLORIDE 2 MILLIGRAM(S): 2 INJECTION INTRAMUSCULAR; INTRAVENOUS; SUBCUTANEOUS at 13:30

## 2021-05-15 RX ADMIN — LISINOPRIL 10 MILLIGRAM(S): 2.5 TABLET ORAL at 06:03

## 2021-05-15 RX ADMIN — Medication 1000 MILLIGRAM(S): at 12:37

## 2021-05-15 RX ADMIN — HYDROMORPHONE HYDROCHLORIDE 2 MILLIGRAM(S): 2 INJECTION INTRAMUSCULAR; INTRAVENOUS; SUBCUTANEOUS at 03:29

## 2021-05-15 RX ADMIN — GABAPENTIN 300 MILLIGRAM(S): 400 CAPSULE ORAL at 12:36

## 2021-05-15 RX ADMIN — Medication 1000 MILLIGRAM(S): at 13:30

## 2021-05-15 RX ADMIN — Medication 1000 MILLIGRAM(S): at 06:03

## 2021-05-15 RX ADMIN — MAGNESIUM HYDROXIDE 30 MILLILITER(S): 400 TABLET, CHEWABLE ORAL at 09:17

## 2021-05-15 RX ADMIN — HYDROMORPHONE HYDROCHLORIDE 2 MILLIGRAM(S): 2 INJECTION INTRAMUSCULAR; INTRAVENOUS; SUBCUTANEOUS at 12:39

## 2021-05-15 RX ADMIN — Medication 325 MILLIGRAM(S): at 12:36

## 2021-05-15 RX ADMIN — HYDROMORPHONE HYDROCHLORIDE 2 MILLIGRAM(S): 2 INJECTION INTRAMUSCULAR; INTRAVENOUS; SUBCUTANEOUS at 04:00

## 2021-05-15 RX ADMIN — HYDROMORPHONE HYDROCHLORIDE 2 MILLIGRAM(S): 2 INJECTION INTRAMUSCULAR; INTRAVENOUS; SUBCUTANEOUS at 10:00

## 2021-05-15 RX ADMIN — Medication 1000 MILLIGRAM(S): at 06:05

## 2021-05-15 NOTE — PROGRESS NOTE ADULT - SUBJECTIVE AND OBJECTIVE BOX
Patient seen and examined at bedside. feels well.    No over night eventd  VItals reviewed; stable afebrile  Labs reviewed:    Review of Systems:  General:denies fever chills, headache, weakness  HEENT: denies blurry vision,diffculty swallowing, difficulty hearing, tinnitus  Cardiovascular: denies chest pain  ,palpitations  Pulmonary:denies shortness of breath, cough, wheezing, hemoptysis  Gastrointestinal: denies abdominal pain, constipation, diarrhea,nausea , vomiting, hematochezia  : denies hematuria, dysuria, or incontinence  Neurological: denies weakness, numbness , tingling, dizziness, tremors  MSK: denies muscle pain, difficulty ambulating, swelling, back pain  skin: denies skin rash, itching, burning, or  skin lesions  Psychiatrical: denies mood disturbances, anxierty, feeling depressed, depression , or difficulty sleeping    Objective:  Vitals  T(C): 37.4 (05-15-21 @ 03:30), Max: 37.6 (05-14-21 @ 19:16)  HR: 84 (05-15-21 @ 06:01) (68 - 86)  BP: 120/69 (05-15-21 @ 06:01) (104/62 - 120/69)  RR: 16 (05-15-21 @ 03:30) (16 - 18)  SpO2: 98% (05-15-21 @ 03:30) (98% - 100%)    Physical Exam:  General: comfortable, no acute distress, well nourished  HEENT: Atraumatic, no LAD, trachea midline, PERRLA  Cardiovascular: normal s1s2, no murmurs, gallops or fricition rubs  Pulmonary: clear to ausculation Bilaterally, no wheezing , rhonchi  Gastrointestinal: soft non tender non distended, no masses felt, no organomegally  Muscloskeletal: no lower extremity edema, intact bilateral lower extremity pulses  Neurological: CN II-12 intact. No focal weakness  Psychiatrical: normal mood, cooperative  SKIN: no rash, lesions or ulcers    Labs:                          10.1   10.62 )-----------( 276      ( 14 May 2021 08:00 )             31.7     05-14    138  |  103  |  15  ----------------------------<  177<H>  4.1   |  27  |  0.79    Ca    9.2      14 May 2021 08:00              Active Medications  MEDICATIONS  (STANDING):  acetaminophen   Tablet .. 1000 milliGRAM(s) Oral every 8 hours  dextrose 40% Gel 15 Gram(s) Oral once  dextrose 5%. 1000 milliLiter(s) (50 mL/Hr) IV Continuous <Continuous>  dextrose 5%. 1000 milliLiter(s) (100 mL/Hr) IV Continuous <Continuous>  dextrose 50% Injectable 25 Gram(s) IV Push once  dextrose 50% Injectable 12.5 Gram(s) IV Push once  dextrose 50% Injectable 25 Gram(s) IV Push once  ferrous    sulfate 325 milliGRAM(s) Oral daily  gabapentin 300 milliGRAM(s) Oral daily  glucagon  Injectable 1 milliGRAM(s) IntraMuscular once  insulin lispro (ADMELOG) corrective regimen sliding scale   SubCutaneous three times a day before meals  insulin lispro (ADMELOG) corrective regimen sliding scale   SubCutaneous at bedtime  lactated ringers. 1000 milliLiter(s) (125 mL/Hr) IV Continuous <Continuous>  lisinopril 10 milliGRAM(s) Oral daily  loratadine 10 milliGRAM(s) Oral daily  polyethylene glycol 3350 17 Gram(s) Oral daily  senna 2 Tablet(s) Oral at bedtime  simvastatin 20 milliGRAM(s) Oral at bedtime    MEDICATIONS  (PRN):  diazepam    Tablet 5 milliGRAM(s) Oral every 8 hours PRN muscle spasm  diphenhydrAMINE 25 milliGRAM(s) Oral every 8 hours PRN Pruritus  HYDROmorphone   Tablet 2 milliGRAM(s) Oral every 3 hours PRN Moderate Pain (4 - 6)  HYDROmorphone   Tablet 4 milliGRAM(s) Oral every 3 hours PRN Severe Pain (7 - 10)  HYDROmorphone  Injectable 0.5 milliGRAM(s) IV Push every 3 hours PRN breakthrough pain  magnesium hydroxide Suspension 30 milliLiter(s) Oral every 12 hours PRN Constipation  ondansetron Injectable 4 milliGRAM(s) IV Push every 6 hours PRN Nausea and/or Vomiting

## 2021-05-15 NOTE — PROGRESS NOTE ADULT - ASSESSMENT
56yo Female  with DM2 not on insulin, LAMBERTO not on CPAP, disc disease s/p l4 laminectomy, presents to  for surgical revision now admitted to the medical wards for aftercare following surgery    Lumbar disc disease s/p l4 laminectomy  Hardware failure s/p hardware revision POD day 2  - pain control as per ortho team  - advance diet as tolerated  - anti-emetics PRN  - incentive spirometer  - bowel regimen  -PT/OT    Leukocytosis  likely physiological due to acute inflammation from surgery  monitor    acute blood loss anemia  likely due to surgery  monitor    DM2   - hold oral meds while admitted  - continue low dose insulin sliding scale with FS qAC and qHS  - diabetic diet    LAMBERTO not on CPAP  nocturnal 02 if needed

## 2021-05-15 NOTE — PROGRESS NOTE ADULT - SUBJECTIVE AND OBJECTIVE BOX
Orthopedic Spine Progress Note      POST OPERATIVE DAY #: 2  STATUS POST:        L4-5 revision posterior fusion, L3-4 posterior fusion         Pre-Op Dx: Fracture of fourth lumbar vertebra      Post-Op Dx:  Fracture of fourth lumbar vertebra      Prin. Procedure:  Fusion of posterior lumbar spine        SUBJECTIVE: Patient seen and examined.     Pain (0-10): 6  Current Pain Management:  [ ] PCA   [ x] Po Analgesics [x ] IM /IV Anagesics     Vital Signs Last 24 Hrs  T(C): 36.2 (13 May 2021 14:30), Max: 36.7 (13 May 2021 09:13)  T(F): 97.2 (13 May 2021 14:30), Max: 98 (13 May 2021 09:13)  HR: 83 (13 May 2021 16:15) (78 - 91)  BP: 132/76 (13 May 2021 16:15) (118/67 - 147/94)  BP(mean): --  RR: 16 (13 May 2021 16:15) (16 - 21)  SpO2: 100% (13 May 2021 16:15) (99% - 100%)  I&O's Detail    13 May 2021 07:01  -  13 May 2021 18:03  --------------------------------------------------------  IN:    Lactated Ringers: 1500 mL  Total IN: 1500 mL    OUT:    Estimated Blood Loss (mL): 350 mL    Indwelling Catheter - Urethral (mL): 200 mL  Total OUT: 550 mL    Total NET: 950 mL    HV: 5 cc    OBJECTIVE:         Wound /Dressing: I/C/D  Neurological: A/O x    3           Sensation: [x ] intact to light touch  [ ] decreased:          Motor exam: [ x ]                   [ x] Lower ext.     Hip Flx  Hip Ext   Hip Abd  Hip Add Quad   Hamstrg   TA       EHL      GS                              R        5/5        5/5        5/5             5/5        5/5        5/5        5/5     5/5      5/5                              L         5/5        5/5        5/5             5/5        5/5        5/5        5/5     5/5      5/5                                                         HV removed           RADIOLOGY & ADDITIONAL STUDIES:                                               LABS: pending    MEDICATIONS  (STANDING):  gabapentin 300 milliGRAM(s) Oral daily  insulin lispro (ADMELOG) corrective regimen sliding scale   SubCutaneous three times a day before meals  lactated ringers. 1000 milliLiter(s) (75 mL/Hr) IV Continuous <Continuous>  loratadine 10 milliGRAM(s) Oral daily  simvastatin 20 milliGRAM(s) Oral at bedtime    MEDICATIONS  (PRN):  diazepam    Tablet 5 milliGRAM(s) Oral every 8 hours PRN muscle spasm  diphenhydrAMINE 25 milliGRAM(s) Oral every 8 hours PRN Pruritus  HYDROmorphone  Injectable 0.5 milliGRAM(s) IV Push every 10 minutes PRN Moderate Pain (4 - 6)  ondansetron Injectable 4 milliGRAM(s) IV Push every 6 hours PRN Nausea and/or Vomiting  ondansetron Injectable 4 milliGRAM(s) IV Push once PRN Nausea and/or Vomiting      A/P :  55y Female   s/p:     L4-5 revision posterior fusion, L3-4 posterior fusion     -    Pain control  -    DVT ppx: SCDs    -    Review labs as indicated     -    Physical Therapy: WBAT in the brace   -    Weight bearing status: spine precautions   -    f/u HV output  -    Dispo: Home [ x]     Rehab [ ]

## 2021-05-15 NOTE — PROGRESS NOTE ADULT - REASON FOR ADMISSION
Aftercare following sleeve gastrectomy
Aftercare following revision of failed hardware post laminectomy

## 2021-05-17 PROBLEM — E66.9 OBESITY, UNSPECIFIED: Chronic | Status: ACTIVE | Noted: 2021-05-06

## 2021-05-17 PROBLEM — S32.009A UNSPECIFIED FRACTURE OF UNSPECIFIED LUMBAR VERTEBRA, INITIAL ENCOUNTER FOR CLOSED FRACTURE: Chronic | Status: ACTIVE | Noted: 2021-05-06

## 2021-05-17 PROBLEM — T84.498A OTHER MECHANICAL COMPLICATION OF OTHER INTERNAL ORTHOPEDIC DEVICES, IMPLANTS AND GRAFTS, INITIAL ENCOUNTER: Chronic | Status: ACTIVE | Noted: 2021-05-06

## 2021-05-17 PROBLEM — E11.9 TYPE 2 DIABETES MELLITUS WITHOUT COMPLICATIONS: Chronic | Status: ACTIVE | Noted: 2021-05-06

## 2021-05-17 PROBLEM — Z92.29 PERSONAL HISTORY OF OTHER DRUG THERAPY: Chronic | Status: ACTIVE | Noted: 2021-05-06

## 2021-05-17 PROBLEM — I10 ESSENTIAL (PRIMARY) HYPERTENSION: Chronic | Status: ACTIVE | Noted: 2021-05-06

## 2021-05-18 LAB
CULTURE RESULTS: SIGNIFICANT CHANGE UP
SPECIMEN SOURCE: SIGNIFICANT CHANGE UP

## 2021-05-26 ENCOUNTER — APPOINTMENT (OUTPATIENT)
Dept: ORTHOPEDIC SURGERY | Facility: CLINIC | Age: 56
End: 2021-05-26
Payer: COMMERCIAL

## 2021-05-26 VITALS
DIASTOLIC BLOOD PRESSURE: 84 MMHG | SYSTOLIC BLOOD PRESSURE: 147 MMHG | TEMPERATURE: 97.2 F | WEIGHT: 95 LBS | BODY MASS INDEX: 12.87 KG/M2 | HEART RATE: 93 BPM | HEIGHT: 72 IN

## 2021-05-26 DIAGNOSIS — T84.498S: ICD-10-CM

## 2021-05-26 PROCEDURE — 72100 X-RAY EXAM L-S SPINE 2/3 VWS: CPT

## 2021-05-26 PROCEDURE — 99024 POSTOP FOLLOW-UP VISIT: CPT

## 2021-05-26 RX ORDER — GABAPENTIN 300 MG/1
300 CAPSULE ORAL
Qty: 30 | Refills: 0 | Status: ACTIVE | COMMUNITY
Start: 2021-04-19 | End: 1900-01-01

## 2021-05-27 NOTE — HISTORY OF PRESENT ILLNESS
[___ Weeks Post Op] : [unfilled] weeks post op [3] : the patient reports pain that is 3/10 in severity [Clean/Dry/Intact] : clean, dry and intact [Vascular Intact] : ~T peripheral vascular exam normal [Negative Silvestre's] : maneuvers demonstrated a negative Silvestre's sign [Sutures Removed] : sutures were removed [Steri-Strips Removed & Replaced] : steri-strips removed and replaced [Limited ADLs] : to participate in activities of daily living with limitations [No Work] : not to work [No Housework] : not to do housework [No Sports] : not to participate in sports [Doing Well] : is doing well [Excellent Pain Control] : has excellent pain control [No Sign of Infection] : is showing no signs of infection [Erythema] : not erythematous [Discharge] : absent of discharge [Swelling] : not swollen [Dehiscence] : not dehisced [de-identified] : Revision lumbar fusion  5/13/21 [de-identified] : Patient is here today for her first post operative visit. Patient states overall she is doing much better pain is gone that she had prior to this surgery.  She is not taking narcotic pain medication and using occasional muscle relaxant.  She is also using Tylenol on an as-needed basis. [de-identified] : Seen with her sister\par Seen with an LSO.\par Seen with a walker\par Grossly intact light touch sensation bilateral lower extremities.  5 out of 5 motor strength in hip flexion knee extension flexion ankle dorsiflexion plantarflexion EHL bilaterally. [de-identified] : AP and lateral image lumbar spine obtained today were compared with x-rays obtained intraoperatively as well as prior to surgery.  Pedicle screw and tano construct is noted from L3-L5 in good position.  No implant loosening or migration identified at this time.  The L4-5 spondylolisthesis reduced at this time.  Interbody cages noted L4-5 in good position.  Posterior lateral bone graft appreciated from L3-L5.  Normal lumbar lordosis noted. [de-identified] : The patient is pleased with the outcome of her surgery at this time with resolution of her preoperative pain symptoms.  She does not have any radicular complaints and reports occasional back discomfort which is consistent with her postoperative status.  Recommended follow-up in 4 weeks which would be 6 weeks following her surgery.  She can start physical therapy in 2 weeks which would be 4 weeks following her surgery.  Refill of gabapentin was provided for her today.  She may continue use of Tylenol and muscle relaxants as tolerated as well.  Recommend continue use of the lumbar sacral orthosis till she is seen for follow-up in 4 weeks.\par \par The patient was educated regarding their condition, treatment options as well as prescribed course of treatment. \par Risks and benefits as well as alternatives to the proposed treatment were also provided to the patient \par They were given the opportunity to have all their questions answered to their satisfaction.\par \par Vital signs were reviewed with the patient and the patient was instructed to followup with their primary care provider for further management.\par \par Healthy lifestyle recommendations were also made including a tobacco free lifestyle, proper diet, and weight control.

## 2021-06-02 ENCOUNTER — EMERGENCY (EMERGENCY)
Facility: HOSPITAL | Age: 56
LOS: 0 days | Discharge: HOME | End: 2021-06-02
Attending: STUDENT IN AN ORGANIZED HEALTH CARE EDUCATION/TRAINING PROGRAM
Payer: COMMERCIAL

## 2021-06-02 VITALS
OXYGEN SATURATION: 100 % | DIASTOLIC BLOOD PRESSURE: 81 MMHG | RESPIRATION RATE: 17 BRPM | TEMPERATURE: 98 F | HEART RATE: 75 BPM | SYSTOLIC BLOOD PRESSURE: 132 MMHG

## 2021-06-02 VITALS
SYSTOLIC BLOOD PRESSURE: 138 MMHG | OXYGEN SATURATION: 99 % | WEIGHT: 190.04 LBS | DIASTOLIC BLOOD PRESSURE: 84 MMHG | HEIGHT: 63 IN | TEMPERATURE: 98 F | RESPIRATION RATE: 20 BRPM | HEART RATE: 92 BPM

## 2021-06-02 DIAGNOSIS — E11.649 TYPE 2 DIABETES MELLITUS WITH HYPOGLYCEMIA WITHOUT COMA: ICD-10-CM

## 2021-06-02 DIAGNOSIS — G47.33 OBSTRUCTIVE SLEEP APNEA (ADULT) (PEDIATRIC): ICD-10-CM

## 2021-06-02 DIAGNOSIS — Z98.1 ARTHRODESIS STATUS: Chronic | ICD-10-CM

## 2021-06-02 DIAGNOSIS — D64.9 ANEMIA, UNSPECIFIED: ICD-10-CM

## 2021-06-02 DIAGNOSIS — E78.00 PURE HYPERCHOLESTEROLEMIA, UNSPECIFIED: ICD-10-CM

## 2021-06-02 DIAGNOSIS — I10 ESSENTIAL (PRIMARY) HYPERTENSION: ICD-10-CM

## 2021-06-02 LAB
GLUCOSE BLDC GLUCOMTR-MCNC: 143 MG/DL — HIGH (ref 70–99)
GLUCOSE BLDC GLUCOMTR-MCNC: 154 MG/DL — HIGH (ref 70–99)
GLUCOSE BLDC GLUCOMTR-MCNC: 155 MG/DL — HIGH (ref 70–99)

## 2021-06-02 PROCEDURE — 99283 EMERGENCY DEPT VISIT LOW MDM: CPT

## 2021-06-02 RX ORDER — LISINOPRIL 2.5 MG/1
1 TABLET ORAL
Qty: 0 | Refills: 0 | DISCHARGE

## 2021-06-02 RX ORDER — GABAPENTIN 400 MG/1
0 CAPSULE ORAL
Qty: 0 | Refills: 0 | DISCHARGE

## 2021-06-02 NOTE — ED ADULT NURSE NOTE - NSIMPLEMENTINTERV_GEN_ALL_ED
Implemented All Fall Risk Interventions:  [place] to call system. Call bell, personal items and telephone within reach. Instruct patient to call for assistance. Room bathroom lighting operational. Non-slip footwear when patient is off stretcher. Physically safe environment: no spills, clutter or unnecessary equipment. Stretcher in lowest position, wheels locked, appropriate side rails in place. Provide visual cue, wrist band, yellow gown, etc. Monitor gait and stability. Monitor for mental status changes and reorient to person, place, and time. Review medications for side effects contributing to fall risk. Reinforce activity limits and safety measures with patient and family.

## 2021-06-02 NOTE — ED PROVIDER NOTE - CLINICAL SUMMARY MEDICAL DECISION MAKING FREE TEXT BOX
pt with normal blood glucose in the ED. feels well. No infectious symptoms (denies cough, fevers, dysuria, rashes). Pt reassured and discharged to outpt f/u. Counseled to take lower dose glipizide or skip dose altogether on days she doesn't eat much.

## 2021-06-02 NOTE — ED ADULT NURSE NOTE - PMH
Anemia  mild  Class 1 obesity with body mass index (BMI) of 33.0 to 33.9 in adult    COVID-19 vaccine series completed  2/2021  Hypercholesterolemia    Hypertension    Internal orthopedic device mechanical complication    Lumbar vertebral fracture  4  LAMBERTO (Obstructive Sleep Apnea)    Seasonal allergies    Type 2 diabetes mellitus    Vertigo  occurs with allergies

## 2021-06-02 NOTE — ED ADULT TRIAGE NOTE - CHIEF COMPLAINT QUOTE
pt states sugar dropped to 58,  took OJ and glucose pill.  .  pt stated that she did not eat much today and took glipizide 2.5mg at 4pm.  pt had back revision surgery on 5/13/21.  pt has had a lot of weight loss in 1 year due to diet/exercise

## 2021-06-02 NOTE — ED PROVIDER NOTE - OBJECTIVE STATEMENT
55F hx DM, posterior spinal fusion 5/13/21, presenting s/p episode of hypoglycemia that occurred while at home this evening. Per pt she did not deviate from her normal 2mg glipizide dose at 4pm. pt notes that she did eat less food throughout the day but following her glipizide dose had a full dinner followed by crackers and almond butter, her usual pre-bed snack. Then as pt was preparing to wind down for bed ~2300 she "didn't feel right" prompting her to check her finger stick which read as 55. Pt promply drank some juice and called 911. States this has never happened to her before.

## 2021-06-02 NOTE — ED ADULT NURSE NOTE - TOBACCO USE
Patient has tolerated his last dalvance. Left in stable condition ambulatory with use of crutches. Former smoker

## 2021-06-02 NOTE — ED ADULT NURSE NOTE - PSH
S/P cervical spinal fusion  2011, 2018  S/P Myomectomy  2000  Status post lumbar spinal fusion  4/2021

## 2021-06-02 NOTE — H&P PST ADULT - GENERAL
Assessment/Plan:        1. Low hemoglobin  HM2(CBC w/o Differential)    Ferritin    Iron and Transferrin Iron Binding Capacity    Vitamin B12   2. Pruritus     3. EBV infection       1. Pruritus - she will continue same treatment. She will see Dermatologist on Monday. We discussed reaction after surgery as also use of ABx in a case of EBV. MRI this week to rule out abscess.  2. EBV  And lower hemoglobin - symptomatic treatment discussed, we will repeat labs today.    This note has been dictated using voice recognition software. Any grammatical or context distortions are unintentional and inherent to the software.      Return in about 2 weeks (around 11/6/2019) for Recheck.    There are no Patient Instructions on file for this visit.        Subjective:    Suma Mark is a 72 y.o. female  here for    Chief Complaint   Patient presents with     Follow-up     F/U 1 Week     1. Itching - has about 2 episodes a day, mostly in her lower back around the incision sites, but also going around the weistline and in her arms. She saw Neurosurgeon and he agreed that she has some skin reaction and rash around the incision site, possible reaction to stiches or implant. She will have MRI to rule out abscess this week.  No fever, chills. No visible rash on the arms. She is using hydrocortisone topical and Hydroxyzine. She stopped Vicodin. Itching is not persistent.  2. Fatigue, still the same. In the blood work, EBV and reactive lymphocytes seen, elevated CRP and ESR, slightly elevated AST,ALT. Hgb was lower at 10.7, was 11.0 after surgery on 9/27/19. She denies any symptoms of GI bleed.      Social History     Socioeconomic History     Marital status:      Spouse name: Not on file     Number of children: Not on file     Years of education: Not on file     Highest education level: Not on file   Occupational History     Not on file   Social Needs     Financial resource strain: Not on file     Food insecurity:     Worry:  Not on file     Inability: Not on file     Transportation needs:     Medical: Not on file     Non-medical: Not on file   Tobacco Use     Smoking status: Never Smoker     Smokeless tobacco: Never Used   Substance and Sexual Activity     Alcohol use: Yes     Comment: 1 cocktail daily     Drug use: No     Sexual activity: Not on file   Lifestyle     Physical activity:     Days per week: Not on file     Minutes per session: Not on file     Stress: Not on file   Relationships     Social connections:     Talks on phone: Not on file     Gets together: Not on file     Attends Orthodox service: Not on file     Active member of club or organization: Not on file     Attends meetings of clubs or organizations: Not on file     Relationship status: Not on file     Intimate partner violence:     Fear of current or ex partner: Not on file     Emotionally abused: Not on file     Physically abused: Not on file     Forced sexual activity: Not on file   Other Topics Concern     Not on file   Social History Narrative     Not on file       Family History   Problem Relation Age of Onset     Dementia Mother      Arthritis Mother      COPD Father      Cardiovascular Father      Hypertension Father      No Medical Problems Sister      No Medical Problems Daughter      No Medical Problems Son      Stroke Maternal Grandmother      Heart disease Paternal Grandmother      Stroke Paternal Grandmother      No Medical Problems Sister      No Medical Problems Sister      No Medical Problems Son      No Medical Problems Daughter      Breast cancer Paternal Aunt         age in 50's     Review of Systems:     A 12 point comprehensive review of systems was negative except as noted in HPI.            Objective:    Physical Exam   /62   Pulse 82   Wt 173 lb 8 oz (78.7 kg)   LMP  (LMP Unknown)   SpO2 98%   BMI 31.73 kg/m      Constitutional: oriented to person, place, and time, appears well-nourished. No distress.   HENT:   Head: Normocephalic.    Mouth/Throat: Oropharynx is clear and moist.   Eyes: Conjunctivae are normal.    Neck: Normal range of motion.   Pulmonary/Chest: Effort normal   Abdominal: Soft, obese.  Skin: she has mild macular erythematous rash around the incision site and where the spine implant is. No rash on her arms or abdomen.  Musculoskeletal: Normal range of motion.   Neurological: alert and oriented to person, place, and time.  Psychiatric:  normal mood and affect.    Patient Active Problem List   Diagnosis     Acquired hypothyroidism     Essential hypertension     Restless leg syndrome     Gastroesophageal reflux disease without esophagitis     Migraine     Hernia, ventral     Osteoporosis     Diverticulosis     S/P total knee replacement     Insomnia, unspecified type     Depression, unspecified depression type     Fibromyalgia     Symptomatic varicose veins of both lower extremities       Current Outpatient Medications on File Prior to Visit   Medication Sig Dispense Refill     amLODIPine (NORVASC) 10 MG tablet TAKE 1 TABLET(10 MG) BY MOUTH AT BEDTIME 90 tablet 3     atorvastatin (LIPITOR) 20 MG tablet Take 20 mg by mouth at bedtime.              botulinum toxin Type A, Cosm, (BOTOX) 100 unit SolR Every 3 months. [facility]       calcium carbonate-vitamin D3 (CALCIUM 600 + D,3,) 600 mg(1,500mg) -400 unit per tablet Take 1 tablet by mouth daily.              cholecalciferol, vitamin D3, 2,000 unit capsule Take 2,000 Units by mouth daily.       citalopram (CELEXA) 20 MG tablet TAKE 1 & 1/2 TABLETS BY MOUTH ONCE DAILY 135 tablet 3     cyanocobalamin (VITAMIN B-12) 50 mcg tablet Take 50 mcg by mouth daily.       dicyclomine (BENTYL) 10 MG capsule TAKE 1 CAPSULE BY MOUTH FOUR TIMES A DAY  1     eletriptan (RELPAX) 40 MG tablet Take 40 mg by mouth daily as needed for migraine. may repeat in 2 hours if necessary              furosemide (LASIX) 20 MG tablet Take 1 tablet (20 mg total) by mouth daily. 90 tablet 3     hydrOXYzine HCl  (ATARAX) 25 MG tablet Take 1 tablet (25 mg total) by mouth 3 (three) times a day as needed for itching. 90 tablet 0     irbesartan (AVAPRO) 150 MG tablet TAKE 1 TABLET(150 MG) BY MOUTH DAILY 90 tablet 3     levothyroxine (SYNTHROID, LEVOTHROID) 88 MCG tablet Take 1 tablet (88 mcg total) by mouth Daily at 6:00 am. 90 tablet 3     LORazepam (ATIVAN) 1 MG tablet TAKE 1/2 TABLET(0.5 MG) BY MOUTH DAILY AS NEEDED FOR ANXIETY 30 tablet 0     pramipexole (MIRAPEX) 0.25 MG tablet Take 1 tablet (0.25 mg total) by mouth 3 (three) times a day. 270 tablet 1     propranolol (INDERAL) 10 MG tablet TAKE 1 TABLET(10 MG) BY MOUTH TWICE DAILY 270 tablet 0     traZODone (DESYREL) 50 MG tablet TAKE 1 TABLET BY MOUTH EVERYDAY AT BEDTIME 90 tablet 3     [DISCONTINUED] HYDROcodone-acetaminophen 5-325 mg per tablet Take 1-2 tablets by mouth every 4 (four) hours as needed. 55 tablet 0     Current Facility-Administered Medications on File Prior to Visit   Medication Dose Route Frequency Provider Last Rate Last Dose     denosumab 60 mg (PROLIA 60 mg/ml)  60 mg Subcutaneous Q6 Months Bernadette Taylor MD   60 mg at 04/11/19 1402               Bernadette Taylor  10/23/2019   negative

## 2021-06-04 NOTE — H&P PST ADULT - MALLAMPATI CLASS
If patient is determined to not be appropriate for pain management then will need to review with my PCP to determine if appropriate to continue on Lyrica.   Class II - visualization of the soft palate, fauces, and uvula

## 2021-06-25 ENCOUNTER — APPOINTMENT (OUTPATIENT)
Dept: ORTHOPEDIC SURGERY | Facility: CLINIC | Age: 56
End: 2021-06-25
Payer: COMMERCIAL

## 2021-06-25 VITALS — DIASTOLIC BLOOD PRESSURE: 78 MMHG | TEMPERATURE: 97.1 F | HEART RATE: 79 BPM | SYSTOLIC BLOOD PRESSURE: 127 MMHG

## 2021-06-25 PROCEDURE — 99024 POSTOP FOLLOW-UP VISIT: CPT

## 2021-06-25 PROCEDURE — 72100 X-RAY EXAM L-S SPINE 2/3 VWS: CPT

## 2021-07-28 ENCOUNTER — APPOINTMENT (OUTPATIENT)
Dept: ORTHOPEDIC SURGERY | Facility: CLINIC | Age: 56
End: 2021-07-28
Payer: COMMERCIAL

## 2021-07-28 PROCEDURE — 72100 X-RAY EXAM L-S SPINE 2/3 VWS: CPT

## 2021-07-28 PROCEDURE — 99024 POSTOP FOLLOW-UP VISIT: CPT

## 2021-08-02 NOTE — HISTORY OF PRESENT ILLNESS
[0] : no pain reported [___ Months Post Op] : [unfilled] months post op [Chills] : no chills [Constipation] : no constipation [Diarrhea] : no diarrhea [Dysuria] : no dysuria [Fever] : no fever [Nausea] : no nausea [Vomiting] : no vomiting [Doing Well] : is doing well [Excellent Pain Control] : has excellent pain control [No Sign of Infection] : is showing no signs of infection [None] : None [de-identified] : S/P revision lumbar fusion L3-5 revision 5/13/21 [de-identified] : Patient is here today for postoperative visit of revision lumbar fusion L3-5 performed on 5/13/21.She states that she has been wearing a lumbar corset and stopped her physical therapy. [de-identified] : Seen with an LSO.\par seen with a cane (chronic use before surgery)\par Grossly intact light touch sensation bilateral lower extremities. 5 out of 5 motor strength in hip flexion knee extension flexion ankle dorsiflexion plantarflexion EHL bilaterally. The surgical incision site(s) was healed, not erythematous, absent of discharge, not swollen and not dehisced. Additional findings included peripheral vascular exam normal and maneuvers demonstrated a negative Silvestre's sign.  [de-identified] : AP and lateral image lumbar spine obtained today were compared with x-rays obtained intraoperatively as well as prior to surgery. Pedicle screw and tano construct is noted from L3-L5 in good position. No implant loosening or migration identified at this time. The L4-5 spondylolisthesis reduced at this time. Interbody cages noted L4-5 in good position. Posterior lateral bone graft appreciated from L3-L5. Normal lumbar lordosis noted. [de-identified] : At this time the patient is doing well with complete resolution of preoperative leg symptoms.  Her back pain postoperatively is improved significantly as well.  At this time recommended additional physical therapy.  Recommended follow-up in 3 months for repeat AP and lateral image of lumbar spine.  The patient at this point is pleased with the outcome of her surgery.  Recommended weaning off a lumbar corset as well.\par \par The patient was educated regarding their condition, treatment options as well as prescribed course of treatment. \par Risks and benefits as well as alternatives to the proposed treatment were also provided to the patient \par They were given the opportunity to have all their questions answered to their satisfaction.\par \par Vital signs were reviewed with the patient and the patient was instructed to followup with their primary care provider for further management.\par \par Healthy lifestyle recommendations were also made including a tobacco free lifestyle, proper diet, and weight control.

## 2021-10-04 ENCOUNTER — RESULT REVIEW (OUTPATIENT)
Age: 56
End: 2021-10-04

## 2021-11-29 ENCOUNTER — APPOINTMENT (OUTPATIENT)
Dept: ORTHOPEDIC SURGERY | Facility: CLINIC | Age: 56
End: 2021-11-29
Payer: COMMERCIAL

## 2021-11-29 ENCOUNTER — NON-APPOINTMENT (OUTPATIENT)
Age: 56
End: 2021-11-29

## 2021-11-29 VITALS
SYSTOLIC BLOOD PRESSURE: 121 MMHG | HEIGHT: 67 IN | DIASTOLIC BLOOD PRESSURE: 78 MMHG | WEIGHT: 200 LBS | BODY MASS INDEX: 31.39 KG/M2 | HEART RATE: 78 BPM

## 2021-11-29 DIAGNOSIS — R26.89 OTHER ABNORMALITIES OF GAIT AND MOBILITY: ICD-10-CM

## 2021-11-29 PROCEDURE — 99214 OFFICE O/P EST MOD 30 MIN: CPT

## 2021-11-29 PROCEDURE — 72100 X-RAY EXAM L-S SPINE 2/3 VWS: CPT

## 2021-11-29 RX ORDER — PROCHLORPERAZINE MALEATE 5 MG/1
5 TABLET ORAL
Qty: 28 | Refills: 0 | Status: ACTIVE | COMMUNITY
Start: 2021-08-06

## 2021-11-29 RX ORDER — LANCETS 28 GAUGE
EACH MISCELLANEOUS
Qty: 200 | Refills: 0 | Status: ACTIVE | COMMUNITY
Start: 2021-06-12

## 2021-11-29 RX ORDER — SUCRALFATE 1 G/10ML
1 SUSPENSION ORAL
Qty: 420 | Refills: 0 | Status: ACTIVE | COMMUNITY
Start: 2021-07-31

## 2021-11-29 RX ORDER — SITAGLIPTIN 50 MG/1
50 TABLET, FILM COATED ORAL
Qty: 90 | Refills: 0 | Status: ACTIVE | COMMUNITY
Start: 2021-06-29

## 2021-11-29 NOTE — REASON FOR VISIT
[Follow-Up Visit] : a follow-up visit for [Back Pain] : back pain [FreeTextEntry2] : Revision lumbar fusion 5/13/21

## 2021-11-29 NOTE — HISTORY OF PRESENT ILLNESS
[Improving] : improving [Daily] : ~He/She~ states the symptoms seem to be occuring daily [Prolonged Sitting] : worsened by prolonged sitting [de-identified] : Patient is here today for repeat xray of lumbar spine. Overall patient is doing well no major complaints. Patient states mainly soreness no real pain. [de-identified] : stretching

## 2021-11-29 NOTE — PHYSICAL EXAM
[de-identified] : seen with a cane (chronic use before surgery)\par Grossly intact light touch sensation bilateral lower extremities. 5 out of 5 motor strength in hip flexion knee extension flexion ankle dorsiflexion plantarflexion EHL bilaterally. The surgical incision site(s) was healed, not erythematous, absent of discharge, not swollen and not dehisced. Additional findings included peripheral vascular exam normal and maneuvers demonstrated a negative Silvestre's sign.  [de-identified] : AP and lateral image lumbar spine obtained today were compared with x-rays obtained intraoperatively as well as prior to surgery. Pedicle screw and tano construct is noted from L3-L5 in good position. No implant loosening or migration identified at this time. The L4-5 spondylolisthesis reduced at this time. Interbody cages noted L4-5 in good position. Posterior lateral bone graft appreciated from L3-L5. Normal lumbar lordosis noted.

## 2021-11-29 NOTE — DISCUSSION/SUMMARY
[Medication Risks Reviewed] : Medication risks reviewed [de-identified] : The patient has a history of gait imbalance for which she uses a cane.  This predates her lumbar spine surgery.  Recommended physical therapy focus on balance training.  She also has reported sagittal plane imbalance and her x-rays show slight possibility sagittal plane imbalance as well as lean to the left.  Physical therapy can help with that as well and a referral was provided.  Overall she is pleased with the outcome of surgery with no new back pain or leg pain complaints.  She is currently not taking any medications.  Strongly recommend a self-directed exercise program as well.  I will see her back in 6 months for a 1 year postoperative checkup at which time we can obtain AP and lateral image of the lumbar spine.\par \par The patient was educated regarding their condition, treatment options as well as prescribed course of treatment. \par Risks and benefits as well as alternatives to the proposed treatment were also provided to the patient \par They were given the opportunity to have all their questions answered to their satisfaction.\par \par Vital signs were reviewed with the patient and the patient was instructed to followup with their primary care provider for further management.\par \par Healthy lifestyle recommendations were also made including a tobacco free lifestyle, proper diet, and weight control.

## 2022-04-25 ENCOUNTER — APPOINTMENT (OUTPATIENT)
Dept: ORTHOPEDIC SURGERY | Facility: CLINIC | Age: 57
End: 2022-04-25
Payer: COMMERCIAL

## 2022-04-25 VITALS
HEART RATE: 92 BPM | WEIGHT: 208 LBS | BODY MASS INDEX: 32.65 KG/M2 | SYSTOLIC BLOOD PRESSURE: 122 MMHG | DIASTOLIC BLOOD PRESSURE: 79 MMHG | HEIGHT: 67 IN

## 2022-04-25 DIAGNOSIS — M43.8X9 OTHER SPECIFIED DEFORMING DORSOPATHIES, SITE UNSPECIFIED: ICD-10-CM

## 2022-04-25 DIAGNOSIS — Z98.1 ARTHRODESIS STATUS: ICD-10-CM

## 2022-04-25 DIAGNOSIS — M43.10 SPONDYLOLISTHESIS, SITE UNSPECIFIED: ICD-10-CM

## 2022-04-25 PROCEDURE — 99213 OFFICE O/P EST LOW 20 MIN: CPT

## 2022-04-25 PROCEDURE — 72100 X-RAY EXAM L-S SPINE 2/3 VWS: CPT

## 2022-04-25 NOTE — PHYSICAL EXAM
[SLR] : negative straight leg raise [de-identified] : Grossly intact light touch sensation bilateral lower extremities. 5 out of 5 motor strength in hip flexion knee extension flexion ankle dorsiflexion plantarflexion EHL bilaterally. The surgical incision site(s) was healed, not erythematous, absent of discharge, not swollen and not dehisced. Additional findings included peripheral vascular exam normal and maneuvers demonstrated a negative Silvestre's sign.  [de-identified] : AP and lateral image lumbar spine obtained today were compared with x-rays obtained intraoperatively as well as prior to surgery. Pedicle screw and tano construct is noted from L3-L5 in good position. No implant loosening or migration identified at this time. The L4-5 spondylolisthesis reduced at this time. Interbody cages noted L4-5 in good position. Posterior lateral bone graft appreciated from L3-L5. Normal lumbar lordosis noted.

## 2022-04-25 NOTE — HISTORY OF PRESENT ILLNESS
[Stable] : stable [0] : a current pain level of 0/10 [Daily] : ~He/She~ states the symptoms seem to be occuring daily [Physical Therapy] : relieved by physical therapy [Rest] : relieved by rest [de-identified] : Patient is here today for her one year check up on her revision lumbar fusion 5/13/21. Overall feels good no real complaints at this time. \par No meds or braces. Currently in PT, did not start in 11/21. Renewed recently [de-identified] : after physical therapy

## 2022-04-25 NOTE — DISCUSSION/SUMMARY
[Medication Risks Reviewed] : Medication risks reviewed [de-identified] : Patient overall is doing well.  She has no complaints.  Compared to preop her leg pain is resolved and her back pain is significantly improved as well.  X-rays obtained today show stable appearance of the construct from L3-L5 with interbody cage in good position.  No implant loosening or migration.  She understands that she has sagittal plane imbalance and may need more extensive reconstruction down to the pelvis if there is any progressive deformity.  For now strongly recommended exercise program and a prescription of physical therapy provided.  I will see her back as needed basis.

## 2022-07-15 NOTE — H&P PST ADULT - VENOUS THROMBOEMBOLISM SCORE
Patient verified name, , and procedure. Type: 1a; abbreviated assessment per anesthesia guidelines  Labs per surgeon: NONE  Labs per anesthesia: NONE      Instructed pt that they will be notified by the doctor office for time of arrival to GI lab. If any questions please call the GI lab at [phone]. Follow diet and prep instructions per office. May have clear liquids until 4 hours prior to time of arrival.    [place] or shower the night before and the am of surgery with antibacterial soap. No lotions, oils, powders, cologne on skin. No make up, eye make up or jewelry. Wear loose fitting comfortable, clean clothing. Must have adult present in building the entire time. Medications for the day of procedure: Albuterol inhaler (bring with you), Atorvastatin, Omeprazole. 6

## 2023-01-13 NOTE — PHARMACOTHERAPY INTERVENTION NOTE - INTERVENTION TYPE MED REC
Detail Level: Generalized Med Rec - Admission Detail Level: Detailed Detail Level: Zone Topical Steroids Recommendations: Triamcinolone as needed Antihistamine Recommendations: Claritin daily

## 2023-04-10 NOTE — ED ADULT NURSE NOTE - BREATH SOUNDS LLL
Colonoscopy scheduled per Dr. Mendenhall's protocol and instructions reviewed, pt verbalized understanding. The patient has been instructed to hold certain medications prior to this procedure and to check with prescribing provider to make sure it would be ok to hold as stated in instructions listed in letter tab.      Diagnosis code from O/A order:  Colon Cancer Screening      Instructions sent livewell      clear

## 2024-05-03 NOTE — ED PROVIDER NOTE - DOMESTIC TRAVEL HIGH RISK QUESTION
IV discontinued. DSD applied. Pt tolerated well. Discharged instructions given to pt, pt voiced understanding.  Pt discharged via AMBULATORY by SELF TO EXIT.   
No

## 2024-05-15 ENCOUNTER — EMERGENCY (EMERGENCY)
Facility: HOSPITAL | Age: 59
LOS: 1 days | Discharge: HOME | End: 2024-05-15
Admitting: EMERGENCY MEDICINE
Payer: COMMERCIAL

## 2024-05-15 VITALS
OXYGEN SATURATION: 97 % | DIASTOLIC BLOOD PRESSURE: 74 MMHG | RESPIRATION RATE: 18 BRPM | HEART RATE: 77 BPM | SYSTOLIC BLOOD PRESSURE: 117 MMHG | TEMPERATURE: 98 F

## 2024-05-15 VITALS
DIASTOLIC BLOOD PRESSURE: 62 MMHG | OXYGEN SATURATION: 100 % | SYSTOLIC BLOOD PRESSURE: 120 MMHG | RESPIRATION RATE: 16 BRPM | HEART RATE: 72 BPM

## 2024-05-15 DIAGNOSIS — Z98.1 ARTHRODESIS STATUS: Chronic | ICD-10-CM

## 2024-05-15 LAB
ALBUMIN SERPL ELPH-MCNC: 4.6 G/DL — SIGNIFICANT CHANGE UP (ref 3.3–5)
ALP SERPL-CCNC: 88 U/L — SIGNIFICANT CHANGE UP (ref 40–120)
ALT FLD-CCNC: 14 U/L — SIGNIFICANT CHANGE UP (ref 4–33)
ANION GAP SERPL CALC-SCNC: 15 MMOL/L — HIGH (ref 7–14)
AST SERPL-CCNC: 33 U/L — HIGH (ref 4–32)
BASOPHILS # BLD AUTO: 0.04 K/UL — SIGNIFICANT CHANGE UP (ref 0–0.2)
BASOPHILS NFR BLD AUTO: 0.5 % — SIGNIFICANT CHANGE UP (ref 0–2)
BILIRUB SERPL-MCNC: 0.4 MG/DL — SIGNIFICANT CHANGE UP (ref 0.2–1.2)
BUN SERPL-MCNC: 16 MG/DL — SIGNIFICANT CHANGE UP (ref 7–23)
CALCIUM SERPL-MCNC: 10.4 MG/DL — SIGNIFICANT CHANGE UP (ref 8.4–10.5)
CHLORIDE SERPL-SCNC: 98 MMOL/L — SIGNIFICANT CHANGE UP (ref 98–107)
CO2 SERPL-SCNC: 23 MMOL/L — SIGNIFICANT CHANGE UP (ref 22–31)
CREAT SERPL-MCNC: 0.69 MG/DL — SIGNIFICANT CHANGE UP (ref 0.5–1.3)
EGFR: 101 ML/MIN/1.73M2 — SIGNIFICANT CHANGE UP
EOSINOPHIL # BLD AUTO: 0.14 K/UL — SIGNIFICANT CHANGE UP (ref 0–0.5)
EOSINOPHIL NFR BLD AUTO: 1.7 % — SIGNIFICANT CHANGE UP (ref 0–6)
GLUCOSE SERPL-MCNC: 164 MG/DL — HIGH (ref 70–99)
HCT VFR BLD CALC: 40.1 % — SIGNIFICANT CHANGE UP (ref 34.5–45)
HGB BLD-MCNC: 13.3 G/DL — SIGNIFICANT CHANGE UP (ref 11.5–15.5)
IANC: 5.64 K/UL — SIGNIFICANT CHANGE UP (ref 1.8–7.4)
IMM GRANULOCYTES NFR BLD AUTO: 0.4 % — SIGNIFICANT CHANGE UP (ref 0–0.9)
LYMPHOCYTES # BLD AUTO: 2.03 K/UL — SIGNIFICANT CHANGE UP (ref 1–3.3)
LYMPHOCYTES # BLD AUTO: 24.3 % — SIGNIFICANT CHANGE UP (ref 13–44)
MAGNESIUM SERPL-MCNC: 2.3 MG/DL — SIGNIFICANT CHANGE UP (ref 1.6–2.6)
MCHC RBC-ENTMCNC: 28.1 PG — SIGNIFICANT CHANGE UP (ref 27–34)
MCHC RBC-ENTMCNC: 33.2 GM/DL — SIGNIFICANT CHANGE UP (ref 32–36)
MCV RBC AUTO: 84.8 FL — SIGNIFICANT CHANGE UP (ref 80–100)
MONOCYTES # BLD AUTO: 0.48 K/UL — SIGNIFICANT CHANGE UP (ref 0–0.9)
MONOCYTES NFR BLD AUTO: 5.7 % — SIGNIFICANT CHANGE UP (ref 2–14)
NEUTROPHILS # BLD AUTO: 5.64 K/UL — SIGNIFICANT CHANGE UP (ref 1.8–7.4)
NEUTROPHILS NFR BLD AUTO: 67.4 % — SIGNIFICANT CHANGE UP (ref 43–77)
NRBC # BLD: 0 /100 WBCS — SIGNIFICANT CHANGE UP (ref 0–0)
NRBC # FLD: 0 K/UL — SIGNIFICANT CHANGE UP (ref 0–0)
PLATELET # BLD AUTO: 287 K/UL — SIGNIFICANT CHANGE UP (ref 150–400)
POTASSIUM SERPL-MCNC: 5 MMOL/L — SIGNIFICANT CHANGE UP (ref 3.5–5.3)
POTASSIUM SERPL-SCNC: 5 MMOL/L — SIGNIFICANT CHANGE UP (ref 3.5–5.3)
PROT SERPL-MCNC: 7.6 G/DL — SIGNIFICANT CHANGE UP (ref 6–8.3)
RBC # BLD: 4.73 M/UL — SIGNIFICANT CHANGE UP (ref 3.8–5.2)
RBC # FLD: 14.3 % — SIGNIFICANT CHANGE UP (ref 10.3–14.5)
SODIUM SERPL-SCNC: 136 MMOL/L — SIGNIFICANT CHANGE UP (ref 135–145)
TROPONIN T, HIGH SENSITIVITY RESULT: 13 NG/L — SIGNIFICANT CHANGE UP
TROPONIN T, HIGH SENSITIVITY RESULT: 14 NG/L — SIGNIFICANT CHANGE UP
TSH SERPL-MCNC: 0.65 UIU/ML — SIGNIFICANT CHANGE UP (ref 0.27–4.2)
WBC # BLD: 8.36 K/UL — SIGNIFICANT CHANGE UP (ref 3.8–10.5)
WBC # FLD AUTO: 8.36 K/UL — SIGNIFICANT CHANGE UP (ref 3.8–10.5)

## 2024-05-15 PROCEDURE — 99285 EMERGENCY DEPT VISIT HI MDM: CPT

## 2024-05-15 PROCEDURE — 93010 ELECTROCARDIOGRAM REPORT: CPT

## 2024-05-15 RX ORDER — METOCLOPRAMIDE HCL 10 MG
10 TABLET ORAL ONCE
Refills: 0 | Status: COMPLETED | OUTPATIENT
Start: 2024-05-15 | End: 2024-05-15

## 2024-05-15 RX ORDER — SODIUM CHLORIDE 9 MG/ML
2000 INJECTION, SOLUTION INTRAVENOUS ONCE
Refills: 0 | Status: COMPLETED | OUTPATIENT
Start: 2024-05-15 | End: 2024-05-15

## 2024-05-15 RX ADMIN — Medication 1 TABLET(S): at 22:08

## 2024-05-15 RX ADMIN — Medication 10 MILLIGRAM(S): at 19:53

## 2024-05-15 RX ADMIN — SODIUM CHLORIDE 2000 MILLILITER(S): 9 INJECTION, SOLUTION INTRAVENOUS at 19:53

## 2024-05-15 NOTE — ED PROVIDER NOTE - PATIENT PORTAL LINK FT
You can access the FollowMyHealth Patient Portal offered by Health system by registering at the following website: [email or web address]. By joining [facility]’s FollowMyHealth portal, you will also be able to view your health information using other applications (apps) compatible with our system.

## 2024-05-15 NOTE — ED PROVIDER NOTE - CARE PLAN
1 Principal Discharge DX:	Palpitations  Secondary Diagnosis:	Lightheadedness  Secondary Diagnosis:	Sinus headache  Secondary Diagnosis:	Dehydration

## 2024-05-15 NOTE — ED PROVIDER NOTE - OBJECTIVE STATEMENT
57 Y/O F PMH DM2 HTN HLD PSH Myomectomy, discectomy and spinal fusion 59 Y/O F PMH DM2 HTN HLD, Vertigo, Anxiety. PSH Myomectomy, discectomy and spinal fusion states that for 15 min while at work she felt palpitations, lightheaded and felt a shaky feeling in her body. Pt states she ate an orange and states her BG was normal when checked by EMS. Pt also notes a headache which feels like frontal pressure similar to her sinusitis type of headaches. Pt states that she was advised she also had issues with her ears in addition to vertigo and states she has undergone MRI's multiple times. Pt states she is following with a Cardiologist and has a stress test scheduled. Pt denies any other sx or acute complaints.

## 2024-05-15 NOTE — ED ADULT TRIAGE NOTE - CHIEF COMPLAINT QUOTE
Patient brought to ER by EMS from work for c/o dizziness, lightheaded and shaky for the past week. Patient has DM: Type 2: FS: 186

## 2024-05-15 NOTE — ED PROVIDER NOTE - NSFOLLOWUPINSTRUCTIONS_ED_ALL_ED_FT
Follow up with your primary doctor and Cardiologist. Bring this packet which includes copies of your results. Continue all previously prescribed medications as directed.  Follow up with your primary care physician in 48-72 hours- bring copies of your results.  Return to the ER for worsening or persistent symptoms, and/or ANY NEW OR CONCERNING SYMPTOMS. THIS INCLUDES BUT IS NOT LIMITED TO CHEST PAIN, SHORTNESS OF BREATH, LIGHTHEADEDNESS, SYNCOPE (IF YOU PASS OUT) OR FOR ANY OTHER SYMPTOMS THAT CONCERN YOU. If you have issues obtaining follow up, please call: [phone] to obtain a doctor or specialist who takes your insurance in your area.  You may call [phone] to make an appointment with the internal medicine clinic.

## 2024-05-15 NOTE — ED PROVIDER NOTE - CLINICAL SUMMARY MEDICAL DECISION MAKING FREE TEXT BOX
57 Y/O F PMH DM2 HTN HLD, Vertigo, Anxiety. PSH Myomectomy, discectomy and spinal fusion states that for 15 min while at work she felt palpitations, lightheaded and felt a shaky feeling in her body. Pt states she ate an orange and states her BG was normal when checked by EMS. Pt also notes a headache which feels like frontal pressure similar to her sinusitis type of headaches. Plan is labs to eval for anemia or electrolyte disturbance, troponin to eval for an atypical presentation of ACS, will hydrate with IV fluid and provide reglan for HA.

## 2024-05-15 NOTE — ED ADULT NURSE NOTE - OBJECTIVE STATEMENT
Patient came in with the complaints of dizziness, shakiness and lightheadedness. No other complaints. No distress noted. specimens collected and sent. Medications given as ordered. Patient tolerated well. Nursing care continues

## 2024-05-15 NOTE — ED PROVIDER NOTE - CRANIAL NERVE AND PUPILLARY EXAM
5/5 strength and intact sensation bilaterally in upper and lower extremity. EOMI, no nystagmus./cranial nerves 2-12 intact

## 2025-03-10 NOTE — ED CDU PROVIDER SUBSEQUENT DAY NOTE - CPE EDP ENMT NORM
Return to the ER if you experience any confusion vomiting or visual changes.  Expect muscle soreness for the next couple days take ibuprofen and Flexeril as needed.  
normal...

## 2025-04-11 NOTE — ED PROVIDER NOTE - PATIENT PORTAL LINK FT
Signed wound care supply order, demographics, and clinic note for DOS (4/11/25) faxed to [facility] for order fulfillment. Confirmation of successful transmission recieved.   You can access the FollowMyHealth Patient Portal offered by [facility] by registering at the following website: [email or web address]. By joining [facility]’s FollowMyHealth portal, you will also be able to view your health information using other applications (apps) compatible with our system.

## 2025-04-22 NOTE — ED PROVIDER NOTE - NS_EDPROVIDERDISPOUSERTYPE_ED_A_ED
[FreeTextEntry1] : Referring from PCP. Rashad Hamm   Marta Loomis is a 59y female with PBC (AMA+ ALP+) cirrhosis, likely biopsy proven (report pending). PT also has HLD, Hypothyroidism& thyroid cancer s/p total thyroidectomy, anxiety, depression, Right bundle branch block.  - Pt went to [facility] ED for palpitation in 3/2025 and currently undergoing cardiology evaluation.  - Pt was told pt has liver cirrhosis iin around 7/2024.  Pt has been on ursodiol 300mg 1T BID since 3/2024. + IQIRVO (elafibranor) since 1/2025. Fibroscan 4/21/25:  8kPa  Today she reports pruritus/dry eye, headache and abd pain  3/31/2025 AST 22 ALT 19  TB 0.4 plt 311 HAV immune  HBsAb -    [Medical Hx] as above [Surgical Hx] BACK SURGERY 2008 and 2010 CERVICAL FUSION HYSTERECTOMY LUMBAR FUSION 2008 NECK SURGERY SHOULDER SURGERY Right thyroidectomy 1999 [Social Hx] Alcohol:  Denies              Tobacco: Never     illicit drug: Denies                        Herb and dietary Supplement: Denies              [FMH of liver disease]  None  [Medications] LEvothroxine Elafibranor Ursodiol 300mg 1T BID   [Labs] 3/31/25 A1C 5 AFP 2.2 CBC wnl.  CMP wnl AST/ALT 22/19  GGT 9 INR 0.96 IGG/IGM wnl Lab   Collection Time 12/27/24 0514 12/26/24 2041 MCH 30.9 31.2 MCHC 34.2 33.8 MCV 91 92 MPV 10.3 10.4  Recent Labs Lab Collection Time 12/27/24 0514 12/26/24 2041  142 K 3.7 4.4 * 106 CO2 28 30 BUN 9 12 CREATININE 0.64 0.81 GLU 88 95 CALCIUM 8.4* 9.0 PROT -- 7.6 ALBUMIN -- 3.8 ALKALPHOS -- 133* SGOTAST -- 15 SGPTALT -- 38 TOTALBILIRUB -- 0.3 PHOS -- 3.7 MG -- 2.3   [Imaging] US abd 2019: Liver: Hepatic steatosis. Nonspecific mild gallbladder wall thickening. No sonographic evidence of  cholelithiasis. Consider further evaluation with nuclear medicine HIDA  scan and/or contrast-enhanced MRI.   [Procedures] EGD 2021: wnl  Colonoscopy 2021: 2 polypectomy  Attending Attestation (For Attendings USE Only)...

## 2025-07-24 NOTE — H&P PST ADULT - BACK
Spoke with Pt she confirmed she wants to go in for induction on the night of 07/30 so Dr. Coates can deliver her.           ----- Message from Lester Coates MD sent at 7/24/2025  4:23 PM CDT -----  Please contact this patient and let her know the following:   I am on-call next Thursday August 31st and Dr. Cloud is on-call Saturday August 2nd.      If she would like to be scheduled for an induction of labor we can bring her in the night of the 30th or the night of the 1st.    I get back into town on Tuesday the 29th and will not see her until the 30th.  If she would like to be scheduled for one of these dates I would like to go ahead and get her on the books to hold the spot.      Let me know her thoughts.   No deformity or limitation of movement